# Patient Record
Sex: FEMALE | Race: WHITE | NOT HISPANIC OR LATINO | Employment: FULL TIME | RURAL
[De-identification: names, ages, dates, MRNs, and addresses within clinical notes are randomized per-mention and may not be internally consistent; named-entity substitution may affect disease eponyms.]

---

## 2020-01-02 ENCOUNTER — HISTORICAL (OUTPATIENT)
Dept: ADMINISTRATIVE | Facility: HOSPITAL | Age: 45
End: 2020-01-02

## 2020-01-07 LAB
LAB AP CAP CHECKLIST - HISTORICAL: NORMAL
LAB AP CLINICAL INFORMATION: NORMAL
LAB AP COMMENTS: NORMAL
LAB AP DIAGNOSIS - HISTORICAL: NORMAL
LAB AP GROSS PATHOLOGY - HISTORICAL: NORMAL
LAB AP SPECIMEN SUBMITTED - HISTORICAL: NORMAL

## 2022-07-08 ENCOUNTER — OFFICE VISIT (OUTPATIENT)
Dept: FAMILY MEDICINE | Facility: CLINIC | Age: 47
End: 2022-07-08
Payer: COMMERCIAL

## 2022-07-08 VITALS
SYSTOLIC BLOOD PRESSURE: 120 MMHG | HEIGHT: 63 IN | BODY MASS INDEX: 25.76 KG/M2 | OXYGEN SATURATION: 99 % | HEART RATE: 70 BPM | TEMPERATURE: 98 F | RESPIRATION RATE: 18 BRPM | DIASTOLIC BLOOD PRESSURE: 84 MMHG | WEIGHT: 145.38 LBS

## 2022-07-08 DIAGNOSIS — M32.9 LUPUS: ICD-10-CM

## 2022-07-08 DIAGNOSIS — R53.82 CHRONIC FATIGUE: ICD-10-CM

## 2022-07-08 DIAGNOSIS — E53.8 VITAMIN B 12 DEFICIENCY: ICD-10-CM

## 2022-07-08 DIAGNOSIS — E55.9 VITAMIN D DEFICIENCY: ICD-10-CM

## 2022-07-08 LAB
25(OH)D3 SERPL-MCNC: 21.2 NG/ML
ALBUMIN SERPL BCP-MCNC: 4 G/DL (ref 3.5–5)
ALBUMIN/GLOB SERPL: 1.1 {RATIO}
ALP SERPL-CCNC: 78 U/L (ref 39–100)
ALT SERPL W P-5'-P-CCNC: 36 U/L (ref 13–56)
ANION GAP SERPL CALCULATED.3IONS-SCNC: 14 MMOL/L (ref 7–16)
AST SERPL W P-5'-P-CCNC: 20 U/L (ref 15–37)
BASOPHILS # BLD AUTO: 0.04 K/UL (ref 0–0.2)
BASOPHILS NFR BLD AUTO: 0.7 % (ref 0–1)
BILIRUB SERPL-MCNC: 0.7 MG/DL (ref 0–1.2)
BUN SERPL-MCNC: 18 MG/DL (ref 7–18)
BUN/CREAT SERPL: 21 (ref 6–20)
CALCIUM SERPL-MCNC: 8.7 MG/DL (ref 8.5–10.1)
CHLORIDE SERPL-SCNC: 104 MMOL/L (ref 98–107)
CO2 SERPL-SCNC: 23 MMOL/L (ref 21–32)
CREAT SERPL-MCNC: 0.86 MG/DL (ref 0.55–1.02)
CRP SERPL-MCNC: 0.96 MG/DL (ref 0–0.8)
DIFFERENTIAL METHOD BLD: ABNORMAL
EOSINOPHIL # BLD AUTO: 0.14 K/UL (ref 0–0.5)
EOSINOPHIL NFR BLD AUTO: 2.5 % (ref 1–4)
ERYTHROCYTE [DISTWIDTH] IN BLOOD BY AUTOMATED COUNT: 14.2 % (ref 11.5–14.5)
ERYTHROCYTE [SEDIMENTATION RATE] IN BLOOD BY WESTERGREN METHOD: 33 MM/HR (ref 0–20)
FOLATE SERPL-MCNC: 13.5 NG/ML (ref 3.1–17.5)
GLOBULIN SER-MCNC: 3.7 G/DL (ref 2–4)
GLUCOSE SERPL-MCNC: 93 MG/DL (ref 74–106)
HCT VFR BLD AUTO: 35 % (ref 38–47)
HGB BLD-MCNC: 11.2 G/DL (ref 12–16)
IMM GRANULOCYTES # BLD AUTO: 0.01 K/UL (ref 0–0.04)
IMM GRANULOCYTES NFR BLD: 0.2 % (ref 0–0.4)
LYMPHOCYTES # BLD AUTO: 1.15 K/UL (ref 1–4.8)
LYMPHOCYTES NFR BLD AUTO: 20.7 % (ref 27–41)
MCH RBC QN AUTO: 26.7 PG (ref 27–31)
MCHC RBC AUTO-ENTMCNC: 32 G/DL (ref 32–36)
MCV RBC AUTO: 83.3 FL (ref 80–96)
MONOCYTES # BLD AUTO: 0.4 K/UL (ref 0–0.8)
MONOCYTES NFR BLD AUTO: 7.2 % (ref 2–6)
MPC BLD CALC-MCNC: 10.9 FL (ref 9.4–12.4)
NEUTROPHILS # BLD AUTO: 3.81 K/UL (ref 1.8–7.7)
NEUTROPHILS NFR BLD AUTO: 68.7 % (ref 53–65)
NRBC # BLD AUTO: 0 X10E3/UL
NRBC, AUTO (.00): 0 %
PLATELET # BLD AUTO: 278 K/UL (ref 150–400)
POTASSIUM SERPL-SCNC: 3.5 MMOL/L (ref 3.5–5.1)
PROT SERPL-MCNC: 7.7 G/DL (ref 6.4–8.2)
RBC # BLD AUTO: 4.2 M/UL (ref 4.2–5.4)
RHEUMATOID FACT SER NEPH-ACNC: NEGATIVE [IU]/ML
SODIUM SERPL-SCNC: 137 MMOL/L (ref 136–145)
T4 SERPL-MCNC: 10.7 ΜG/DL (ref 4.8–13.9)
TSH SERPL DL<=0.005 MIU/L-ACNC: 1.06 UIU/ML (ref 0.36–3.74)
VIT B12 SERPL-MCNC: 435 PG/ML (ref 193–986)
WBC # BLD AUTO: 5.55 K/UL (ref 4.5–11)

## 2022-07-08 PROCEDURE — 3074F PR MOST RECENT SYSTOLIC BLOOD PRESSURE < 130 MM HG: ICD-10-PCS | Mod: ,,, | Performed by: FAMILY MEDICINE

## 2022-07-08 PROCEDURE — 3079F DIAST BP 80-89 MM HG: CPT | Mod: ,,, | Performed by: FAMILY MEDICINE

## 2022-07-08 PROCEDURE — 80050 GENERAL HEALTH PANEL: CPT | Mod: ,,, | Performed by: CLINICAL MEDICAL LABORATORY

## 2022-07-08 PROCEDURE — 1159F MED LIST DOCD IN RCRD: CPT | Mod: ,,, | Performed by: FAMILY MEDICINE

## 2022-07-08 PROCEDURE — 99214 OFFICE O/P EST MOD 30 MIN: CPT | Mod: ,,, | Performed by: FAMILY MEDICINE

## 2022-07-08 PROCEDURE — 86038 ANTINUCLEAR ANTIBODIES: CPT | Mod: ,,, | Performed by: CLINICAL MEDICAL LABORATORY

## 2022-07-08 PROCEDURE — 80050 PR  GENERAL HEALTH PANEL: ICD-10-PCS | Mod: ,,, | Performed by: CLINICAL MEDICAL LABORATORY

## 2022-07-08 PROCEDURE — 86235 ANTIEXTRACTABLE NUCLEAR AG: ICD-10-PCS | Mod: ,,, | Performed by: CLINICAL MEDICAL LABORATORY

## 2022-07-08 PROCEDURE — 84436 T4: ICD-10-PCS | Mod: ,,, | Performed by: CLINICAL MEDICAL LABORATORY

## 2022-07-08 PROCEDURE — 3079F PR MOST RECENT DIASTOLIC BLOOD PRESSURE 80-89 MM HG: ICD-10-PCS | Mod: ,,, | Performed by: FAMILY MEDICINE

## 2022-07-08 PROCEDURE — 86225 DNA ANTIBODY NATIVE: CPT | Mod: ,,, | Performed by: CLINICAL MEDICAL LABORATORY

## 2022-07-08 PROCEDURE — 82306 VITAMIN D: ICD-10-PCS | Mod: ,,, | Performed by: CLINICAL MEDICAL LABORATORY

## 2022-07-08 PROCEDURE — 85651 RBC SED RATE NONAUTOMATED: CPT | Mod: ,,, | Performed by: CLINICAL MEDICAL LABORATORY

## 2022-07-08 PROCEDURE — 86430 RHEUMATOID FACTOR TEST QUAL: CPT | Mod: ,,, | Performed by: CLINICAL MEDICAL LABORATORY

## 2022-07-08 PROCEDURE — 86430 RHEUMATOID FACTOR SCREEN: ICD-10-PCS | Mod: ,,, | Performed by: CLINICAL MEDICAL LABORATORY

## 2022-07-08 PROCEDURE — 1159F PR MEDICATION LIST DOCUMENTED IN MEDICAL RECORD: ICD-10-PCS | Mod: ,,, | Performed by: FAMILY MEDICINE

## 2022-07-08 PROCEDURE — 82746 ASSAY OF FOLIC ACID SERUM: CPT | Mod: ,,, | Performed by: CLINICAL MEDICAL LABORATORY

## 2022-07-08 PROCEDURE — 3008F BODY MASS INDEX DOCD: CPT | Mod: ,,, | Performed by: FAMILY MEDICINE

## 2022-07-08 PROCEDURE — 82607 VITAMIN B12/FOLATE, SERUM PANEL: ICD-10-PCS | Mod: ,,, | Performed by: CLINICAL MEDICAL LABORATORY

## 2022-07-08 PROCEDURE — 86235 NUCLEAR ANTIGEN ANTIBODY: CPT | Mod: ,,, | Performed by: CLINICAL MEDICAL LABORATORY

## 2022-07-08 PROCEDURE — 82746 VITAMIN B12/FOLATE, SERUM PANEL: ICD-10-PCS | Mod: ,,, | Performed by: CLINICAL MEDICAL LABORATORY

## 2022-07-08 PROCEDURE — 86140 C-REACTIVE PROTEIN: CPT | Mod: ,,, | Performed by: CLINICAL MEDICAL LABORATORY

## 2022-07-08 PROCEDURE — 99214 PR OFFICE/OUTPT VISIT, EST, LEVL IV, 30-39 MIN: ICD-10-PCS | Mod: ,,, | Performed by: FAMILY MEDICINE

## 2022-07-08 PROCEDURE — 86038 ANA EIA W/REFLEX DSDNA/ENA: ICD-10-PCS | Mod: ,,, | Performed by: CLINICAL MEDICAL LABORATORY

## 2022-07-08 PROCEDURE — 82607 VITAMIN B-12: CPT | Mod: ,,, | Performed by: CLINICAL MEDICAL LABORATORY

## 2022-07-08 PROCEDURE — 84436 ASSAY OF TOTAL THYROXINE: CPT | Mod: ,,, | Performed by: CLINICAL MEDICAL LABORATORY

## 2022-07-08 PROCEDURE — 86140 C-REACTIVE PROTEIN: ICD-10-PCS | Mod: ,,, | Performed by: CLINICAL MEDICAL LABORATORY

## 2022-07-08 PROCEDURE — 3074F SYST BP LT 130 MM HG: CPT | Mod: ,,, | Performed by: FAMILY MEDICINE

## 2022-07-08 PROCEDURE — 82306 VITAMIN D 25 HYDROXY: CPT | Mod: ,,, | Performed by: CLINICAL MEDICAL LABORATORY

## 2022-07-08 PROCEDURE — 86225 ANTI-DNA ANTIBODY, DOUBLE-STRANDED: ICD-10-PCS | Mod: ,,, | Performed by: CLINICAL MEDICAL LABORATORY

## 2022-07-08 PROCEDURE — 3008F PR BODY MASS INDEX (BMI) DOCUMENTED: ICD-10-PCS | Mod: ,,, | Performed by: FAMILY MEDICINE

## 2022-07-08 PROCEDURE — 85651 SEDIMENTATION RATE, AUTOMATED: ICD-10-PCS | Mod: ,,, | Performed by: CLINICAL MEDICAL LABORATORY

## 2022-07-08 RX ORDER — PREDNISONE 10 MG/1
10 TABLET ORAL DAILY
Qty: 30 TABLET | Refills: 0 | Status: SHIPPED | OUTPATIENT
Start: 2022-07-08 | End: 2022-09-07 | Stop reason: ALTCHOICE

## 2022-07-08 RX ORDER — DICLOFENAC SODIUM 50 MG/1
50 TABLET, DELAYED RELEASE ORAL 2 TIMES DAILY
Qty: 60 TABLET | Refills: 1 | Status: SHIPPED | OUTPATIENT
Start: 2022-07-08 | End: 2022-09-28 | Stop reason: SDUPTHER

## 2022-07-08 RX ORDER — ERGOCALCIFEROL 1.25 MG/1
50000 CAPSULE ORAL
Qty: 12 CAPSULE | Refills: 1 | Status: SHIPPED | OUTPATIENT
Start: 2022-07-08 | End: 2023-03-01

## 2022-07-08 NOTE — ASSESSMENT & PLAN NOTE
Vitamin-D and B12 levels today.  CBC today.  Will hold off on B12 injections into we get her B12 level back.

## 2022-07-08 NOTE — ASSESSMENT & PLAN NOTE
Patient has chronic fatigue probably secondary to her lupus.  Will treat her with prednisone today and put her on diclofenac for pain.  She is also having some insomnia.  Will give her a trial of Ambien 5 mg at HS.  Follow-up in 1 month or after lab test her back.

## 2022-07-08 NOTE — ASSESSMENT & PLAN NOTE
History of vitamin-D deficiency.  Will start her on 83281 units weekly.  Check a vitamin-D level today.

## 2022-07-08 NOTE — ASSESSMENT & PLAN NOTE
I suspicion a flare of her lupus so will check an EDWINA rheumatoid factor sed rate in CRP.  Will start her on prednisone over a 3 week..  Will also give her Voltaren  50 mg b.i.d. for pain.  Will follow-up after her lab tests are complete in 2 weeks.

## 2022-07-08 NOTE — PROGRESS NOTES
"   Farhan Steve DO   [address] [place]  [address]      PATIENT NAME: Anamika Banda  : 1975  DATE: 22  MRN: [id number]      Billing Provider: Farhan Steve DO  Level of Service:   Patient PCP Information     Provider PCP Type    Farhan Steve DO General          Reason for Visit / Chief Complaint: Headache (Has been having a lot of headaches over the last couple of weeks.) and Fatigue (Feels very tired and like she is in a "fog".  Has been going on for about the last month or so but has been getting worse over the past couple of weeks.)       Update PCP  Update Chief Complaint         History of Present Illness / Problem Focused Workflow     Anamika Banda presents to the clinic with Headache (Has been having a lot of headaches over the last couple of weeks.) and Fatigue (Feels very tired and like she is in a "fog".  Has been going on for about the last month or so but has been getting worse over the past couple of weeks.)     Patient presents today with increasing fatigue and worsening aches and pains all over.  She is also having difficulty sleeping with her able to initiate sleep her about 2-3 hours but not able to maintain sleep.  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  She does have aches and pains all over has a history of lupus but has stopped her medication in the past.  She has been on Plaquenil in the past.  She denies any fever chills or night sweats.  She has had B12 and vitamin-D deficiencies in the past.  Patient does have a history of a melanoma on her upper back within the last 2 years.    Headache   Pertinent negatives include no abdominal pain, back pain, coughing, dizziness, ear pain, eye pain, eye redness, fever, nausea, neck pain, numbness, seizures, sinus pressure, sore throat, vomiting or weakness.   Fatigue  Associated symptoms include arthralgias, fatigue and headaches. Pertinent negatives " include no abdominal pain, change in bowel habit, chest pain, chills, congestion, coughing, fever, myalgias, nausea, neck pain, numbness, rash, sore throat, vertigo, vomiting or weakness.       Review of Systems     Review of Systems   Constitutional: Positive for fatigue. Negative for activity change, appetite change, chills and fever.   HENT: Negative for nasal congestion, ear discharge, ear pain, mouth dryness, mouth sores, postnasal drip, sinus pressure/congestion, sore throat and voice change.    Eyes: Negative for pain, discharge, redness, itching and visual disturbance.   Respiratory: Negative for apnea, cough, chest tightness, shortness of breath and wheezing.    Cardiovascular: Negative for chest pain, palpitations and leg swelling.   Gastrointestinal: Negative for abdominal distention, abdominal pain, anal bleeding, blood in stool, change in bowel habit, constipation, diarrhea, nausea, vomiting, reflux and change in bowel habit.   Endocrine: Negative for cold intolerance, heat intolerance, polydipsia, polyphagia and polyuria.   Genitourinary: Negative for difficulty urinating, enuresis, frequency, genital sores, hematuria, hot flashes, menstrual irregularity, urgency and vaginal dryness.   Musculoskeletal: Positive for arthralgias. Negative for back pain, gait problem, leg pain, myalgias and neck pain.   Integumentary:  Negative for rash, mole/lesion, breast mass, breast discharge and breast tenderness.   Allergic/Immunologic: Negative for environmental allergies and food allergies.   Neurological: Positive for headaches. Negative for dizziness, vertigo, tremors, seizures, syncope, facial asymmetry, speech difficulty, weakness, light-headedness, numbness, disturbances in coordination, memory loss and coordination difficulties.   Hematological: Negative for adenopathy. Does not bruise/bleed easily.   Psychiatric/Behavioral: Negative for agitation, behavioral problems, confusion, decreased concentration,  dysphoric mood, hallucinations, self-injury, sleep disturbance and suicidal ideas. The patient is not nervous/anxious and is not hyperactive.    Breast: Negative for mass and tenderness      Medical / Social / Family History     Past Medical History:   Diagnosis Date    Lupus        Past Surgical History:   Procedure Laterality Date    AUGMENTATION OF BREAST Bilateral      SECTION         Social History  Ms.  reports that she has never smoked. She has never used smokeless tobacco. She reports previous alcohol use. She reports previous drug use.    Family History  Ms.'s family history includes Cancer in her father; Hypertension in her mother.    Medications and Allergies     Medications  No outpatient medications have been marked as taking for the 22 encounter (Office Visit) with Farhan Steve DO.       Allergies  Review of patient's allergies indicates:  No Known Allergies    Physical Examination     Vitals:    22 1041   BP: 120/84   Pulse: 70   Resp: 18   Temp: 98.2 °F (36.8 °C)     Physical Exam  Vitals reviewed.   Constitutional:       General: She is not in acute distress.     Appearance: Normal appearance. She is normal weight.   HENT:      Head: Normocephalic and atraumatic.      Nose: Nose normal.      Mouth/Throat:      Mouth: Mucous membranes are moist.      Pharynx: Oropharynx is clear. No oropharyngeal exudate or posterior oropharyngeal erythema.   Eyes:      General: No scleral icterus.     Extraocular Movements: Extraocular movements intact.      Conjunctiva/sclera: Conjunctivae normal.      Pupils: Pupils are equal, round, and reactive to light.   Neck:      Vascular: No carotid bruit.   Cardiovascular:      Rate and Rhythm: Normal rate and regular rhythm.      Pulses: Normal pulses.      Heart sounds: Normal heart sounds. No murmur heard.    No friction rub. No gallop.   Pulmonary:      Effort: Pulmonary effort is normal.      Breath sounds: Normal breath sounds. No wheezing.    Abdominal:      General: Abdomen is flat. Bowel sounds are normal.      Palpations: Abdomen is soft.      Tenderness: There is no abdominal tenderness.   Musculoskeletal:         General: Normal range of motion.      Cervical back: Normal range of motion and neck supple. No tenderness.      Right lower leg: No edema.      Left lower leg: No edema.   Lymphadenopathy:      Cervical: No cervical adenopathy.   Skin:     General: Skin is warm and dry.      Capillary Refill: Capillary refill takes less than 2 seconds.      Coloration: Skin is not jaundiced.      Findings: No lesion or rash.   Neurological:      General: No focal deficit present.      Mental Status: She is alert and oriented to person, place, and time. Mental status is at baseline.      Cranial Nerves: No cranial nerve deficit.      Sensory: No sensory deficit.      Motor: No weakness.      Coordination: Coordination normal.      Gait: Gait normal.      Deep Tendon Reflexes: Reflexes normal.   Psychiatric:         Mood and Affect: Mood normal.         Behavior: Behavior normal.         Thought Content: Thought content normal.         Judgment: Judgment normal.               No results found for: WBC, HGB, HCT, MCV, PLT       No results found for: NA, K, CL, CO2, GLU, BUN, CREATININE, CALCIUM, PROT, ALBUMIN, BILITOT, ALKPHOS, AST, ALT, ANIONGAP, ESTGFRAFRICA, EGFRNONAA   No image results found.     Procedures   Assessment and Plan (including Health Maintenance)      Problem List  Smart Sets  Document Outside HM   :    Plan:         Health Maintenance Due   Topic Date Due    Cervical Cancer Screening  Never done    Lipid Panel  Never done    COVID-19 Vaccine (1) Never done    TETANUS VACCINE  Never done    Mammogram  Never done    Colorectal Cancer Screening  Never done       Problem List Items Addressed This Visit        Immunology/Multi System    Lupus    Current Assessment & Plan     I suspicion a flare of her lupus so will check an EDWINA rheumatoid  factor sed rate in CRP.  Will start her on prednisone over a 3 week..  Will also give her Voltaren  50 mg b.i.d. for pain.  Will follow-up after her lab tests are complete in 2 weeks.           Relevant Medications    predniSONE (DELTASONE) 10 MG tablet    diclofenac (VOLTAREN) 50 MG EC tablet    Other Relevant Orders    CBC Auto Differential    Comprehensive Metabolic Panel    T4    Sedimentation Rate    C-Reactive Protein    EDWINA EIA w/ Reflex to dsDNA/HARMEET    Rheumatoid Factor Screen    Ambulatory referral/consult to Rheumatology       Endocrine    Vitamin B 12 deficiency    Current Assessment & Plan     Vitamin-D and B12 levels today.  CBC today.  Will hold off on B12 injections into we get her B12 level back.           Relevant Orders    Vitamin B12 & Folate    Vitamin D deficiency    Current Assessment & Plan     History of vitamin-D deficiency.  Will start her on 30519 units weekly.  Check a vitamin-D level today.           Relevant Medications    ergocalciferol (ERGOCALCIFEROL) 50,000 unit Cap    Other Relevant Orders    Vitamin D    Vitamin D       Other    Chronic fatigue    Current Assessment & Plan     Patient has chronic fatigue probably secondary to her lupus.  Will treat her with prednisone today and put her on diclofenac for pain.  She is also having some insomnia.  Will give her a trial of Ambien 5 mg at HS.  Follow-up in 1 month or after lab test her back.           Relevant Orders    CBC Auto Differential    Comprehensive Metabolic Panel    T4    TSH          Health Maintenance Topics with due status: Not Due       Topic Last Completion Date    Influenza Vaccine Not Due       No future appointments.     No follow-ups on file.     Signature:  Farhan Steve DO  [facility]  [id number] [place] [address], [place]  [address]    Date of encounter: 7/8/22

## 2022-07-12 DIAGNOSIS — F51.01 PRIMARY INSOMNIA: ICD-10-CM

## 2022-07-12 LAB
ANA SER QL: POSITIVE
DSDNA IGG SERPL IA-ACNC: 9 IU (ref 0–24)
DSDNA IGG SERPL IA-ACNC: NEGATIVE [IU]/ML
ENA AB SER QL IA: 2.6 EUS (ref 0–19.9)
ENA AB SER QL IA: NEGATIVE

## 2022-07-12 RX ORDER — ZOLPIDEM TARTRATE 5 MG/1
5 TABLET ORAL NIGHTLY PRN
Qty: 30 TABLET | Refills: 2 | Status: SHIPPED | OUTPATIENT
Start: 2022-07-12 | End: 2023-03-24 | Stop reason: SDUPTHER

## 2022-07-14 ENCOUNTER — TELEPHONE (OUTPATIENT)
Dept: FAMILY MEDICINE | Facility: CLINIC | Age: 47
End: 2022-07-14
Payer: COMMERCIAL

## 2022-07-14 DIAGNOSIS — M32.9 LUPUS: Primary | ICD-10-CM

## 2022-09-07 ENCOUNTER — OFFICE VISIT (OUTPATIENT)
Dept: FAMILY MEDICINE | Facility: CLINIC | Age: 47
End: 2022-09-07
Payer: COMMERCIAL

## 2022-09-07 VITALS
TEMPERATURE: 99 F | OXYGEN SATURATION: 99 % | WEIGHT: 147 LBS | HEART RATE: 64 BPM | RESPIRATION RATE: 20 BRPM | DIASTOLIC BLOOD PRESSURE: 78 MMHG | HEIGHT: 63 IN | SYSTOLIC BLOOD PRESSURE: 120 MMHG | BODY MASS INDEX: 26.05 KG/M2

## 2022-09-07 DIAGNOSIS — M32.9 LUPUS: Primary | ICD-10-CM

## 2022-09-07 DIAGNOSIS — R53.82 CHRONIC FATIGUE: ICD-10-CM

## 2022-09-07 PROCEDURE — 1159F PR MEDICATION LIST DOCUMENTED IN MEDICAL RECORD: ICD-10-PCS | Mod: ,,, | Performed by: FAMILY MEDICINE

## 2022-09-07 PROCEDURE — 99214 PR OFFICE/OUTPT VISIT, EST, LEVL IV, 30-39 MIN: ICD-10-PCS | Mod: ,,, | Performed by: FAMILY MEDICINE

## 2022-09-07 PROCEDURE — 1159F MED LIST DOCD IN RCRD: CPT | Mod: ,,, | Performed by: FAMILY MEDICINE

## 2022-09-07 PROCEDURE — 99214 OFFICE O/P EST MOD 30 MIN: CPT | Mod: ,,, | Performed by: FAMILY MEDICINE

## 2022-09-07 PROCEDURE — 3074F PR MOST RECENT SYSTOLIC BLOOD PRESSURE < 130 MM HG: ICD-10-PCS | Mod: ,,, | Performed by: FAMILY MEDICINE

## 2022-09-07 PROCEDURE — 3008F PR BODY MASS INDEX (BMI) DOCUMENTED: ICD-10-PCS | Mod: ,,, | Performed by: FAMILY MEDICINE

## 2022-09-07 PROCEDURE — 3074F SYST BP LT 130 MM HG: CPT | Mod: ,,, | Performed by: FAMILY MEDICINE

## 2022-09-07 PROCEDURE — 3078F DIAST BP <80 MM HG: CPT | Mod: ,,, | Performed by: FAMILY MEDICINE

## 2022-09-07 PROCEDURE — 3078F PR MOST RECENT DIASTOLIC BLOOD PRESSURE < 80 MM HG: ICD-10-PCS | Mod: ,,, | Performed by: FAMILY MEDICINE

## 2022-09-07 PROCEDURE — 3008F BODY MASS INDEX DOCD: CPT | Mod: ,,, | Performed by: FAMILY MEDICINE

## 2022-09-07 RX ORDER — PREDNISONE 10 MG/1
10 TABLET ORAL DAILY
Qty: 30 TABLET | Refills: 0 | Status: SHIPPED | OUTPATIENT
Start: 2022-09-07 | End: 2022-09-28 | Stop reason: ALTCHOICE

## 2022-09-07 RX ORDER — HYDROXYCHLOROQUINE SULFATE 200 MG/1
200 TABLET, FILM COATED ORAL DAILY
Qty: 30 TABLET | Refills: 5 | Status: SHIPPED | OUTPATIENT
Start: 2022-09-07 | End: 2023-03-24

## 2022-09-07 RX ORDER — HYDROCODONE BITARTRATE AND ACETAMINOPHEN 7.5; 325 MG/1; MG/1
1 TABLET ORAL EVERY 6 HOURS PRN
Qty: 20 TABLET | Refills: 0 | Status: SHIPPED | OUTPATIENT
Start: 2022-09-07 | End: 2023-07-20

## 2022-09-07 NOTE — ASSESSMENT & PLAN NOTE
Lupus with worsening hip pain primarily on the left side.  Will start her back on a prednisone taper over a period of 10 days.  Will also put her back on Plaquenil 200 mg daily.  Reviewed her  and did give her Norco 7.5 mg 20. Total for pain.  Follow-up on a p.r.n. basis or 2 weeks

## 2022-09-07 NOTE — ASSESSMENT & PLAN NOTE
History chronic fatigue related to her lupus.  Will start her back on Plaquenil 200 mg once daily.  Recheck her liver function tests in 2 weeks.  Will also recheck a sed rate and CRP at that time.   present x 4 quadrants

## 2022-09-07 NOTE — PROGRESS NOTES
Farhan Steve DO   [address] [place]  [address]      PATIENT NAME: Anamika Banda  : 1975  DATE: 22  MRN: [id number]      Billing Provider: Farhan Steve DO  Level of Service:   Patient PCP Information       Provider PCP Type    Farhan Steve DO General            Reason for Visit / Chief Complaint: Arthritis (Patient is having a lot of hip pain. She sees the rheumatologist in November for consultation())       Update PCP  Update Chief Complaint         History of Present Illness / Problem Focused Workflow     Anamika Banda presents to the clinic with Arthritis (Patient is having a lot of hip pain. She sees the rheumatologist in November for consultation())     Patient is having a lot of hip pain. she also is having a lot of fatigue.  She has previously been on Plaquenil but has not been restarted on it by Rheumatology.  She states that she is having trouble walking towards the end of the day.  Most her pain is in her left hip but she does have some her right hip.  She denies any fevers chills sweats.  She denies any radiation of pain from her back.    Arthritis  She complains of joint swelling. Pertinent negatives include no diarrhea, fatigue, fever or rash.     Review of Systems     Review of Systems   Constitutional:  Positive for activity change. Negative for appetite change, chills, fatigue and fever.   HENT:  Negative for nasal congestion, ear discharge, ear pain, mouth dryness, mouth sores, postnasal drip, sinus pressure/congestion, sore throat and voice change.    Eyes:  Negative for pain, discharge, redness, itching and visual disturbance.   Respiratory:  Negative for apnea, cough, chest tightness, shortness of breath and wheezing.    Cardiovascular:  Negative for chest pain, palpitations and leg swelling.   Gastrointestinal:  Negative for abdominal distention, abdominal pain, anal bleeding, blood in stool, change in bowel  habit, constipation, diarrhea, nausea, vomiting, reflux and change in bowel habit.   Endocrine: Negative for cold intolerance, heat intolerance, polydipsia, polyphagia and polyuria.   Genitourinary:  Negative for difficulty urinating, enuresis, frequency, genital sores, hematuria, hot flashes, menstrual irregularity, urgency and vaginal dryness.   Musculoskeletal:  Positive for arthritis, joint swelling, leg pain and joint deformity. Negative for arthralgias, back pain, gait problem, myalgias and neck pain.   Integumentary:  Negative for rash, mole/lesion, breast mass, breast discharge and breast tenderness.   Allergic/Immunologic: Negative for environmental allergies and food allergies.   Neurological:  Negative for dizziness, vertigo, tremors, seizures, syncope, facial asymmetry, speech difficulty, weakness, light-headedness, numbness, headaches, coordination difficulties, memory loss and coordination difficulties.   Hematological:  Negative for adenopathy. Does not bruise/bleed easily.   Psychiatric/Behavioral:  Negative for agitation, behavioral problems, confusion, decreased concentration, dysphoric mood, hallucinations, self-injury, sleep disturbance and suicidal ideas. The patient is not nervous/anxious and is not hyperactive.    Breast: Negative for mass and tenderness    Medical / Social / Family History     Past Medical History:   Diagnosis Date    Arthritis     Insomnia     Lupus        Past Surgical History:   Procedure Laterality Date    AUGMENTATION OF BREAST Bilateral      SECTION         Social History  Ms.  reports that she has never smoked. She has never used smokeless tobacco. She reports current alcohol use of about 1.0 standard drink per week. She reports that she does not use drugs.    Family History  Ms.'s family history includes Cancer in her father; Hypertension in her mother.    Medications and Allergies     Medications  Outpatient Medications Marked as Taking for the 22  encounter (Office Visit) with Farhan Steve, DO   Medication Sig Dispense Refill    diclofenac (VOLTAREN) 50 MG EC tablet Take 1 tablet (50 mg total) by mouth 2 (two) times daily. 60 tablet 1    ergocalciferol (ERGOCALCIFEROL) 50,000 unit Cap Take 1 capsule (50,000 Units total) by mouth every 7 days. 12 capsule 1    zolpidem (AMBIEN) 5 MG Tab Take 1 tablet (5 mg total) by mouth nightly as needed (sleep). 30 tablet 2       Allergies  Review of patient's allergies indicates:  No Known Allergies    Physical Examination     Vitals:    09/07/22 1609   BP: 120/78   Pulse: 64   Resp: 20   Temp: 98.7 °F (37.1 °C)     Physical Exam  Vitals reviewed.   Constitutional:       Appearance: Normal appearance. She is normal weight.   HENT:      Head: Normocephalic and atraumatic.      Nose: Nose normal.      Mouth/Throat:      Mouth: Mucous membranes are moist.      Pharynx: Oropharynx is clear. No oropharyngeal exudate or posterior oropharyngeal erythema.   Eyes:      General: No scleral icterus.     Extraocular Movements: Extraocular movements intact.      Conjunctiva/sclera: Conjunctivae normal.      Pupils: Pupils are equal, round, and reactive to light.   Neck:      Vascular: No carotid bruit.   Cardiovascular:      Rate and Rhythm: Normal rate and regular rhythm.      Pulses: Normal pulses.      Heart sounds: Normal heart sounds. No murmur heard.    No gallop.   Pulmonary:      Effort: Pulmonary effort is normal.      Breath sounds: Normal breath sounds. No wheezing.   Abdominal:      General: Abdomen is flat. Bowel sounds are normal.      Palpations: Abdomen is soft.   Musculoskeletal:         General: Tenderness present. Normal range of motion.      Cervical back: Normal range of motion and neck supple.      Right lower leg: No edema.      Left lower leg: No edema.      Comments: Decreased range of motion in the left hip.  There is no did tenderness in the trochanteric bursa area.  She does have some pain on flexion of  the hip.  Abduction is decreased.  Neurovascular is intact.   Lymphadenopathy:      Cervical: No cervical adenopathy.   Skin:     General: Skin is warm and dry.      Capillary Refill: Capillary refill takes less than 2 seconds.      Coloration: Skin is not jaundiced.      Findings: No lesion.   Neurological:      General: No focal deficit present.      Mental Status: She is alert and oriented to person, place, and time. Mental status is at baseline.      Cranial Nerves: No cranial nerve deficit.      Sensory: No sensory deficit.      Motor: No weakness.      Coordination: Coordination normal.      Gait: Gait normal.      Deep Tendon Reflexes: Reflexes normal.   Psychiatric:         Mood and Affect: Mood normal.         Behavior: Behavior normal.         Thought Content: Thought content normal.         Judgment: Judgment normal.             Lab Results   Component Value Date    WBC 5.55 07/08/2022    HGB 11.2 (L) 07/08/2022    HCT 35.0 (L) 07/08/2022    MCV 83.3 07/08/2022     07/08/2022          Sodium   Date Value Ref Range Status   07/08/2022 137 136 - 145 mmol/L Final     Potassium   Date Value Ref Range Status   07/08/2022 3.5 3.5 - 5.1 mmol/L Final     Chloride   Date Value Ref Range Status   07/08/2022 104 98 - 107 mmol/L Final     CO2   Date Value Ref Range Status   07/08/2022 23 21 - 32 mmol/L Final     Glucose   Date Value Ref Range Status   07/08/2022 93 74 - 106 mg/dL Final     BUN   Date Value Ref Range Status   07/08/2022 18 7 - 18 mg/dL Final     Creatinine   Date Value Ref Range Status   07/08/2022 0.86 0.55 - 1.02 mg/dL Final     Calcium   Date Value Ref Range Status   07/08/2022 8.7 8.5 - 10.1 mg/dL Final     Total Protein   Date Value Ref Range Status   07/08/2022 7.7 6.4 - 8.2 g/dL Final     Albumin   Date Value Ref Range Status   07/08/2022 4.0 3.5 - 5.0 g/dL Final     Bilirubin, Total   Date Value Ref Range Status   07/08/2022 0.7 0.0 - 1.2 mg/dL Final     Alk Phos   Date Value Ref Range  Status   07/08/2022 78 39 - 100 U/L Final     AST   Date Value Ref Range Status   07/08/2022 20 15 - 37 U/L Final     ALT   Date Value Ref Range Status   07/08/2022 36 13 - 56 U/L Final     Anion Gap   Date Value Ref Range Status   07/08/2022 14 7 - 16 mmol/L Final     eGFR   Date Value Ref Range Status   07/08/2022 76 >=60 mL/min/1.73m² Final      No image results found.     Procedures   Assessment and Plan (including Health Maintenance)      Problem List  Smart Sets  Document Outside HM   :    Plan:         Health Maintenance Due   Topic Date Due    Cervical Cancer Screening  Never done    Lipid Panel  Never done    COVID-19 Vaccine (1) Never done    TETANUS VACCINE  Never done    Mammogram  Never done    Colorectal Cancer Screening  Never done    Influenza Vaccine (1) Never done       Problem List Items Addressed This Visit          Immunology/Multi System    Lupus - Primary    Current Assessment & Plan     Lupus with worsening hip pain primarily on the left side.  Will start her back on a prednisone taper over a period of 10 days.  Will also put her back on Plaquenil 200 mg daily.  Reviewed her  and did give her Norco 7.5 mg 20. Total for pain.  Follow-up on a p.r.n. basis or 2 weeks         Relevant Medications    hydrOXYchloroQUINE (PLAQUENIL) 200 mg tablet    predniSONE (DELTASONE) 10 MG tablet    HYDROcodone-acetaminophen (NORCO) 7.5-325 mg per tablet    Other Relevant Orders    Hepatic Function Panel       Other    Chronic fatigue    Current Assessment & Plan     History chronic fatigue related to her lupus.  Will start her back on Plaquenil 200 mg once daily.  Recheck her liver function tests in 2 weeks.  Will also recheck a sed rate and CRP at that time.            The patient has no Health Maintenance topics of status Not Due    Future Appointments   Date Time Provider Department Center   9/28/2022  8:00 AM Farhan Steve DO [facility]   11/3/2022  1:40 PM Alexis Gray MD [facility]  [facility]        Follow up in about 2 weeks (around 9/21/2022).     Signature:  Farhan Steve, [place] [id number] [place]  [address]    Date of encounter: 9/7/22

## 2022-09-28 ENCOUNTER — OFFICE VISIT (OUTPATIENT)
Dept: FAMILY MEDICINE | Facility: CLINIC | Age: 47
End: 2022-09-28
Payer: COMMERCIAL

## 2022-09-28 VITALS
OXYGEN SATURATION: 99 % | DIASTOLIC BLOOD PRESSURE: 76 MMHG | HEART RATE: 84 BPM | RESPIRATION RATE: 18 BRPM | WEIGHT: 147 LBS | BODY MASS INDEX: 26.05 KG/M2 | SYSTOLIC BLOOD PRESSURE: 116 MMHG | HEIGHT: 63 IN | TEMPERATURE: 99 F

## 2022-09-28 DIAGNOSIS — E53.8 VITAMIN B 12 DEFICIENCY: ICD-10-CM

## 2022-09-28 DIAGNOSIS — Z12.11 ENCOUNTER FOR SCREENING FOR MALIGNANT NEOPLASM OF COLON: ICD-10-CM

## 2022-09-28 DIAGNOSIS — Z13.220 SCREENING FOR LIPOID DISORDERS: ICD-10-CM

## 2022-09-28 DIAGNOSIS — Z00.00 ROUTINE GENERAL MEDICAL EXAMINATION AT A HEALTH CARE FACILITY: Primary | ICD-10-CM

## 2022-09-28 DIAGNOSIS — Z13.1 SCREENING FOR DIABETES MELLITUS: ICD-10-CM

## 2022-09-28 DIAGNOSIS — C43.59 MALIGNANT MELANOMA OF TORSO EXCLUDING BREAST: ICD-10-CM

## 2022-09-28 DIAGNOSIS — M32.9 LUPUS: ICD-10-CM

## 2022-09-28 DIAGNOSIS — Z12.31 ENCOUNTER FOR SCREENING MAMMOGRAM FOR MALIGNANT NEOPLASM OF BREAST: ICD-10-CM

## 2022-09-28 DIAGNOSIS — E55.9 VITAMIN D DEFICIENCY: ICD-10-CM

## 2022-09-28 PROCEDURE — 3008F PR BODY MASS INDEX (BMI) DOCUMENTED: ICD-10-PCS | Mod: ,,, | Performed by: FAMILY MEDICINE

## 2022-09-28 PROCEDURE — 99396 PR PREVENTIVE VISIT,EST,40-64: ICD-10-PCS | Mod: ,,, | Performed by: FAMILY MEDICINE

## 2022-09-28 PROCEDURE — 1159F MED LIST DOCD IN RCRD: CPT | Mod: ,,, | Performed by: FAMILY MEDICINE

## 2022-09-28 PROCEDURE — 3078F DIAST BP <80 MM HG: CPT | Mod: ,,, | Performed by: FAMILY MEDICINE

## 2022-09-28 PROCEDURE — 3074F SYST BP LT 130 MM HG: CPT | Mod: ,,, | Performed by: FAMILY MEDICINE

## 2022-09-28 PROCEDURE — 3078F PR MOST RECENT DIASTOLIC BLOOD PRESSURE < 80 MM HG: ICD-10-PCS | Mod: ,,, | Performed by: FAMILY MEDICINE

## 2022-09-28 PROCEDURE — 3008F BODY MASS INDEX DOCD: CPT | Mod: ,,, | Performed by: FAMILY MEDICINE

## 2022-09-28 PROCEDURE — 99396 PREV VISIT EST AGE 40-64: CPT | Mod: ,,, | Performed by: FAMILY MEDICINE

## 2022-09-28 PROCEDURE — 1159F PR MEDICATION LIST DOCUMENTED IN MEDICAL RECORD: ICD-10-PCS | Mod: ,,, | Performed by: FAMILY MEDICINE

## 2022-09-28 PROCEDURE — 3074F PR MOST RECENT SYSTOLIC BLOOD PRESSURE < 130 MM HG: ICD-10-PCS | Mod: ,,, | Performed by: FAMILY MEDICINE

## 2022-09-28 RX ORDER — DICLOFENAC POTASSIUM 50 MG/1
1 TABLET, FILM COATED ORAL 2 TIMES DAILY
COMMUNITY
Start: 2022-08-17 | End: 2023-07-20

## 2022-09-29 DIAGNOSIS — Z12.11 SCREENING FOR MALIGNANT NEOPLASM OF COLON: Primary | ICD-10-CM

## 2022-09-29 LAB
ALBUMIN SERPL BCP-MCNC: 4 G/DL (ref 3.5–5)
ALP SERPL-CCNC: 55 U/L (ref 39–100)
ALT SERPL W P-5'-P-CCNC: 33 U/L (ref 13–56)
AST SERPL W P-5'-P-CCNC: 21 U/L (ref 15–37)
BILIRUB DIRECT SERPL-MCNC: <0.1 MG/DL (ref 0–0.2)
BILIRUB SERPL-MCNC: 0.5 MG/DL (ref ?–1.2)
CHOLEST SERPL-MCNC: 260 MG/DL (ref 0–200)
CHOLEST/HDLC SERPL: 5.8 {RATIO}
GLUCOSE SERPL-MCNC: 101 MG/DL (ref 74–106)
HDLC SERPL-MCNC: 45 MG/DL (ref 40–60)
LDLC SERPL CALC-MCNC: 179 MG/DL
LDLC/HDLC SERPL: 4 {RATIO}
NONHDLC SERPL-MCNC: 215 MG/DL
PROT SERPL-MCNC: 6.9 G/DL (ref 6.4–8.2)
TRIGL SERPL-MCNC: 181 MG/DL (ref 35–150)
VLDLC SERPL-MCNC: 36 MG/DL

## 2022-09-29 PROCEDURE — 82947 GLUCOSE, FASTING: ICD-10-PCS | Mod: ,,, | Performed by: CLINICAL MEDICAL LABORATORY

## 2022-09-29 PROCEDURE — 80076 HEPATIC FUNCTION PANEL: CPT | Mod: ,,, | Performed by: CLINICAL MEDICAL LABORATORY

## 2022-09-29 PROCEDURE — 80061 LIPID PANEL: ICD-10-PCS | Mod: ,,, | Performed by: CLINICAL MEDICAL LABORATORY

## 2022-09-29 PROCEDURE — 80061 LIPID PANEL: CPT | Mod: ,,, | Performed by: CLINICAL MEDICAL LABORATORY

## 2022-09-29 PROCEDURE — 80076 HEPATIC FUNCTION PANEL: ICD-10-PCS | Mod: ,,, | Performed by: CLINICAL MEDICAL LABORATORY

## 2022-09-29 PROCEDURE — 82947 ASSAY GLUCOSE BLOOD QUANT: CPT | Mod: ,,, | Performed by: CLINICAL MEDICAL LABORATORY

## 2022-09-29 RX ORDER — SODIUM CHLORIDE, SODIUM LACTATE, POTASSIUM CHLORIDE, CALCIUM CHLORIDE 600; 310; 30; 20 MG/100ML; MG/100ML; MG/100ML; MG/100ML
INJECTION, SOLUTION INTRAVENOUS CONTINUOUS
Status: CANCELLED | OUTPATIENT
Start: 2022-09-29

## 2022-09-29 NOTE — PROGRESS NOTES
Farhan Steve DO   [address] [place]  [address]      PATIENT NAME: Anamika Banda  : 1975  DATE: 22  MRN: [id number]      Billing Provider: Farhan Steve DO  Level of Service:   Patient PCP Information       Provider PCP Type    Farhan Steve DO General            Reason for Visit / Chief Complaint: Healthy You (Pt is not fasting this morning. )       Update PCP  Update Chief Complaint         History of Present Illness / Problem Focused Workflow     Anamika Banda presents to the clinic with Healthy You (Pt is not fasting this morning. )     Patient is in for healthy you today.  She has had history of a melanoma and she does have a lot of family history of cancers.  She also has history of systemic lupus and is on Plaquenil and sees Rheumatology in for her problem.  She does take diclofenac for her arthritis symptoms.  She denies any chest pain shortness of breath palpitations.  She does take Ambien for insomnia.  Patient has not had a colonoscopy and requested a mammogram.      Review of Systems     Review of Systems   Constitutional:  Negative for activity change, appetite change, chills, fatigue and fever.   HENT:  Negative for nasal congestion, ear discharge, ear pain, mouth dryness, mouth sores, postnasal drip, sinus pressure/congestion, sore throat and voice change.    Eyes:  Negative for pain, discharge, redness, itching and visual disturbance.   Respiratory:  Negative for apnea, cough, chest tightness, shortness of breath and wheezing.    Cardiovascular:  Negative for chest pain, palpitations and leg swelling.   Gastrointestinal:  Negative for abdominal distention, abdominal pain, anal bleeding, blood in stool, change in bowel habit, constipation, diarrhea, nausea, vomiting, reflux and change in bowel habit.   Endocrine: Negative for cold intolerance, heat intolerance, polydipsia, polyphagia and polyuria.   Genitourinary:  Negative for difficulty  urinating, enuresis, frequency, genital sores, hematuria, hot flashes, menstrual irregularity, urgency and vaginal dryness.   Musculoskeletal:  Negative for arthralgias, back pain, gait problem, leg pain, myalgias and neck pain.   Integumentary:  Negative for rash, mole/lesion, breast mass, breast discharge and breast tenderness.   Allergic/Immunologic: Negative for environmental allergies and food allergies.   Neurological:  Negative for dizziness, vertigo, tremors, seizures, syncope, facial asymmetry, speech difficulty, weakness, light-headedness, numbness, headaches, coordination difficulties, memory loss and coordination difficulties.   Hematological:  Negative for adenopathy. Does not bruise/bleed easily.   Psychiatric/Behavioral:  Negative for agitation, behavioral problems, confusion, decreased concentration, dysphoric mood, hallucinations, self-injury, sleep disturbance and suicidal ideas. The patient is not nervous/anxious and is not hyperactive.    Breast: Negative for mass and tenderness    Medical / Social / Family History     Past Medical History:   Diagnosis Date    Arthritis     Insomnia     Lupus        Past Surgical History:   Procedure Laterality Date    AUGMENTATION OF BREAST Bilateral      SECTION         Social History  Ms.  reports that she has never smoked. She has never used smokeless tobacco. She reports current alcohol use of about 1.0 standard drink per week. She reports that she does not use drugs.    Family History  Ms.'s family history includes Cancer in her father; Hypertension in her mother.    Medications and Allergies     Medications  Outpatient Medications Marked as Taking for the 22 encounter (Office Visit) with Farhan Steve, DO   Medication Sig Dispense Refill    diclofenac (CATAFLAM) 50 MG tablet Take 1 tablet by mouth 2 (two) times a day.      ergocalciferol (ERGOCALCIFEROL) 50,000 unit Cap Take 1 capsule (50,000 Units total) by mouth every 7 days. 12 capsule 1     HYDROcodone-acetaminophen (NORCO) 7.5-325 mg per tablet Take 1 tablet by mouth every 6 (six) hours as needed for Pain. 20 tablet 0    hydrOXYchloroQUINE (PLAQUENIL) 200 mg tablet Take 1 tablet (200 mg total) by mouth once daily. 30 tablet 5       Allergies  Review of patient's allergies indicates:  No Known Allergies    Physical Examination     Vitals:    09/28/22 0847   BP: 116/76   Pulse: 84   Resp: 18   Temp: 98.5 °F (36.9 °C)     Physical Exam  Vitals reviewed.   Constitutional:       General: She is not in acute distress.     Appearance: Normal appearance. She is normal weight.   HENT:      Head: Normocephalic and atraumatic.      Nose: Nose normal.      Mouth/Throat:      Mouth: Mucous membranes are moist.      Pharynx: Oropharynx is clear. No oropharyngeal exudate or posterior oropharyngeal erythema.   Eyes:      General: No scleral icterus.     Extraocular Movements: Extraocular movements intact.      Conjunctiva/sclera: Conjunctivae normal.      Pupils: Pupils are equal, round, and reactive to light.   Neck:      Vascular: No carotid bruit.   Cardiovascular:      Rate and Rhythm: Normal rate and regular rhythm.      Pulses: Normal pulses.      Heart sounds: Normal heart sounds. No murmur heard.    No gallop.   Pulmonary:      Effort: Pulmonary effort is normal.      Breath sounds: Normal breath sounds. No wheezing.   Abdominal:      General: Abdomen is flat. Bowel sounds are normal.      Palpations: Abdomen is soft.      Tenderness: There is no abdominal tenderness.   Musculoskeletal:         General: Normal range of motion.      Cervical back: Normal range of motion and neck supple.      Right lower leg: No edema.      Left lower leg: No edema.   Lymphadenopathy:      Cervical: No cervical adenopathy.   Skin:     General: Skin is warm and dry.      Capillary Refill: Capillary refill takes less than 2 seconds.      Coloration: Skin is not jaundiced.      Findings: No lesion or rash.   Neurological:       General: No focal deficit present.      Mental Status: She is alert and oriented to person, place, and time. Mental status is at baseline.      Cranial Nerves: No cranial nerve deficit.      Sensory: No sensory deficit.      Motor: No weakness.      Coordination: Coordination normal.      Gait: Gait normal.      Deep Tendon Reflexes: Reflexes normal.   Psychiatric:         Mood and Affect: Mood normal.         Behavior: Behavior normal.         Thought Content: Thought content normal.         Judgment: Judgment normal.             Lab Results   Component Value Date    WBC 5.55 07/08/2022    HGB 11.2 (L) 07/08/2022    HCT 35.0 (L) 07/08/2022    MCV 83.3 07/08/2022     07/08/2022          Sodium   Date Value Ref Range Status   07/08/2022 137 136 - 145 mmol/L Final     Potassium   Date Value Ref Range Status   07/08/2022 3.5 3.5 - 5.1 mmol/L Final     Chloride   Date Value Ref Range Status   07/08/2022 104 98 - 107 mmol/L Final     CO2   Date Value Ref Range Status   07/08/2022 23 21 - 32 mmol/L Final     Glucose   Date Value Ref Range Status   07/08/2022 93 74 - 106 mg/dL Final     BUN   Date Value Ref Range Status   07/08/2022 18 7 - 18 mg/dL Final     Creatinine   Date Value Ref Range Status   07/08/2022 0.86 0.55 - 1.02 mg/dL Final     Calcium   Date Value Ref Range Status   07/08/2022 8.7 8.5 - 10.1 mg/dL Final     Total Protein   Date Value Ref Range Status   07/08/2022 7.7 6.4 - 8.2 g/dL Final     Albumin   Date Value Ref Range Status   07/08/2022 4.0 3.5 - 5.0 g/dL Final     Bilirubin, Total   Date Value Ref Range Status   07/08/2022 0.7 0.0 - 1.2 mg/dL Final     Alk Phos   Date Value Ref Range Status   07/08/2022 78 39 - 100 U/L Final     AST   Date Value Ref Range Status   07/08/2022 20 15 - 37 U/L Final     ALT   Date Value Ref Range Status   07/08/2022 36 13 - 56 U/L Final     Anion Gap   Date Value Ref Range Status   07/08/2022 14 7 - 16 mmol/L Final     eGFR   Date Value Ref Range Status    07/08/2022 76 >=60 mL/min/1.73m² Final      No image results found.     Procedures   Assessment and Plan (including Health Maintenance)      Problem List  Smart Sets  Document Outside HM   :    Plan:         Health Maintenance Due   Topic Date Due    Lipid Panel  Never done    TETANUS VACCINE  Never done    Mammogram  Never done    Colorectal Cancer Screening  Never done       Problem List Items Addressed This Visit          Immunology/Multi System    Lupus    Current Assessment & Plan     History of lupus is currently stable.  Maintain her on her Plaquenil.  She is to follow-up with Rheumatology.            Oncology    Malignant melanoma of torso excluding breast    Current Assessment & Plan     Mammogram right breast that was totally excised is.  Follows up with Dermatology.  No recurrence.            Endocrine    Vitamin B 12 deficiency    Current Assessment & Plan     History of B12 deficiency and will replace that with oral medication follow-up p.r.n. or 6 months.         Vitamin D deficiency    Current Assessment & Plan     And history of vitamin-D deficiency and we will refill her weekly medication follow-up on a p.r.n. basis.  Labs in 6 months            Other    Routine general medical examination at a health care facility - Primary    Current Assessment & Plan     History of cancers in her family but she also has had a melanoma in the past.  Will obtain mammogram as well as a colonoscopy i.e. referral to GI.  Will check a lipid and glucose on her today.            The patient has no Health Maintenance topics of status Not Due    Future Appointments   Date Time Provider Department Center   11/3/2022  1:40 PM [facility] MD [facility]   1/25/2023  9:30 AM [facility] MAMMO1 [facility]   9/28/2023  8:00 AM Farhan Steve DO [facility] A[facility] Tran        Follow up in about 3 months (around 12/28/2022).     Signature:  DO [facility]  [id number]  [facility] [address]  [place]  [address]    Date of encounter: 9/28/22

## 2022-09-29 NOTE — ASSESSMENT & PLAN NOTE
History of cancers in her family but she also has had a melanoma in the past.  Will obtain mammogram as well as a colonoscopy i.e. referral to GI.  Will check a lipid and glucose on her today.

## 2022-09-29 NOTE — ASSESSMENT & PLAN NOTE
History of B12 deficiency and will replace that with oral medication follow-up p.r.n. or 6 months.

## 2022-09-29 NOTE — ASSESSMENT & PLAN NOTE
History of lupus is currently stable.  Maintain her on her Plaquenil.  She is to follow-up with Rheumatology.

## 2022-09-29 NOTE — ASSESSMENT & PLAN NOTE
And history of vitamin-D deficiency and we will refill her weekly medication follow-up on a p.r.n. basis.  Labs in 6 months

## 2022-10-05 ENCOUNTER — ANESTHESIA EVENT (OUTPATIENT)
Dept: GASTROENTEROLOGY | Facility: HOSPITAL | Age: 47
End: 2022-10-05
Payer: COMMERCIAL

## 2022-10-05 ENCOUNTER — ANESTHESIA (OUTPATIENT)
Dept: GASTROENTEROLOGY | Facility: HOSPITAL | Age: 47
End: 2022-10-05
Payer: COMMERCIAL

## 2022-10-05 ENCOUNTER — HOSPITAL ENCOUNTER (OUTPATIENT)
Dept: GASTROENTEROLOGY | Facility: HOSPITAL | Age: 47
Discharge: HOME | End: 2022-10-05
Attending: SURGERY
Payer: COMMERCIAL

## 2022-10-05 VITALS
BODY MASS INDEX: 24.8 KG/M2 | DIASTOLIC BLOOD PRESSURE: 70 MMHG | TEMPERATURE: 98 F | WEIGHT: 140 LBS | HEIGHT: 63 IN | SYSTOLIC BLOOD PRESSURE: 108 MMHG | HEART RATE: 69 BPM | RESPIRATION RATE: 20 BRPM | OXYGEN SATURATION: 98 %

## 2022-10-05 DIAGNOSIS — Z12.11 SCREENING FOR MALIGNANT NEOPLASM OF COLON: ICD-10-CM

## 2022-10-05 DIAGNOSIS — Z12.11 ENCOUNTER FOR SCREENING FOR MALIGNANT NEOPLASM OF COLON: ICD-10-CM

## 2022-10-05 LAB — HCG UR QL IA.RAPID: NEGATIVE

## 2022-10-05 PROCEDURE — D9220A PRA ANESTHESIA: Mod: ,,, | Performed by: NURSE ANESTHETIST, CERTIFIED REGISTERED

## 2022-10-05 PROCEDURE — G0105 COLORECTAL SCRN; HI RISK IND: ICD-10-PCS | Mod: ,,, | Performed by: SURGERY

## 2022-10-05 PROCEDURE — 63600175 PHARM REV CODE 636 W HCPCS: Performed by: SURGERY

## 2022-10-05 PROCEDURE — D9220A PRA ANESTHESIA: ICD-10-PCS | Mod: ,,, | Performed by: NURSE ANESTHETIST, CERTIFIED REGISTERED

## 2022-10-05 PROCEDURE — 37000009 HC ANESTHESIA EA ADD 15 MINS

## 2022-10-05 PROCEDURE — 37000008 HC ANESTHESIA 1ST 15 MINUTES

## 2022-10-05 PROCEDURE — 25000003 PHARM REV CODE 250: Performed by: NURSE ANESTHETIST, CERTIFIED REGISTERED

## 2022-10-05 PROCEDURE — 27000284 HC CANNULA NASAL: Performed by: NURSE ANESTHETIST, CERTIFIED REGISTERED

## 2022-10-05 PROCEDURE — G0105 COLORECTAL SCRN; HI RISK IND: HCPCS | Mod: ,,, | Performed by: SURGERY

## 2022-10-05 PROCEDURE — G0105 COLORECTAL SCRN; HI RISK IND: HCPCS

## 2022-10-05 PROCEDURE — 81025 URINE PREGNANCY TEST: CPT | Performed by: SURGERY

## 2022-10-05 PROCEDURE — G0121 COLON CA SCRN NOT HI RSK IND: HCPCS

## 2022-10-05 PROCEDURE — 63600175 PHARM REV CODE 636 W HCPCS: Performed by: NURSE ANESTHETIST, CERTIFIED REGISTERED

## 2022-10-05 RX ORDER — SODIUM CHLORIDE, SODIUM LACTATE, POTASSIUM CHLORIDE, CALCIUM CHLORIDE 600; 310; 30; 20 MG/100ML; MG/100ML; MG/100ML; MG/100ML
INJECTION, SOLUTION INTRAVENOUS CONTINUOUS
Status: DISCONTINUED | OUTPATIENT
Start: 2022-10-05 | End: 2022-10-06 | Stop reason: HOSPADM

## 2022-10-05 RX ORDER — LIDOCAINE HYDROCHLORIDE 20 MG/ML
INJECTION INTRAVENOUS
Status: DISCONTINUED | OUTPATIENT
Start: 2022-10-05 | End: 2022-10-05

## 2022-10-05 RX ORDER — PROPOFOL 10 MG/ML
VIAL (ML) INTRAVENOUS
Status: DISCONTINUED | OUTPATIENT
Start: 2022-10-05 | End: 2022-10-05

## 2022-10-05 RX ADMIN — PROPOFOL 50 MG: 10 INJECTION, EMULSION INTRAVENOUS at 12:10

## 2022-10-05 RX ADMIN — LIDOCAINE HYDROCHLORIDE 50 MG: 20 INJECTION INTRAVENOUS at 12:10

## 2022-10-05 RX ADMIN — PROPOFOL 100 MG: 10 INJECTION, EMULSION INTRAVENOUS at 12:10

## 2022-10-05 RX ADMIN — SODIUM CHLORIDE, POTASSIUM CHLORIDE, SODIUM LACTATE AND CALCIUM CHLORIDE: 600; 310; 30; 20 INJECTION, SOLUTION INTRAVENOUS at 10:10

## 2022-10-05 NOTE — H&P
[facility] - Endoscopy  History & Physical    Subjective:      Chief Complaint/Reason for Admission: Patient is a 48 yo female who is here for evaluation for a colonoscopy.  She never had a colonoscopy.  They deny any abdominal pain, nausea, weight loss, constipation, nor any blood in stool.  Pt's father and paternal GM both with colon cancer in their 60s and 70s respectively      Anamika Banda is a 47 y.o. female.    Past Medical History:   Diagnosis Date    Arthritis     Insomnia     Lupus      Past Surgical History:   Procedure Laterality Date    AUGMENTATION OF BREAST Bilateral      SECTION       Family History   Problem Relation Age of Onset    Hypertension Mother     Cancer Father      Social History     Tobacco Use    Smoking status: Never    Smokeless tobacco: Never   Substance Use Topics    Alcohol use: Yes     Alcohol/week: 1.0 standard drink     Types: 1 Glasses of wine per week     Comment: occassionally    Drug use: Never       (Not in a hospital admission)    Review of patient's allergies indicates:  No Known Allergies     Review of Systems   Constitutional:  Negative for fever and weight loss.   HENT:  Negative for hearing loss.    Eyes:  Negative for blurred vision.   Respiratory:  Negative for cough.    Cardiovascular:  Negative for chest pain.   Gastrointestinal:  Negative for abdominal pain, blood in stool, constipation, diarrhea, nausea and vomiting.   Genitourinary:  Negative for dysuria.   Skin:  Negative for rash.   Neurological:  Negative for dizziness and tremors.   Psychiatric/Behavioral:  Negative for hallucinations and suicidal ideas.      Objective:      Vital Signs (Most Recent)  Temp: 98.1 °F (36.7 °C) (10/05/22 1004)  Pulse: 86 (10/05/22 1004)  Resp: 16 (10/05/22 1004)  BP: 139/81 (10/05/22 1004)  SpO2: 99 % (10/05/22 1004)    Vital Signs Range (Last 24H):  Temp:  [98.1 °F (36.7 °C)]   Pulse:  [86]   Resp:  [16]   BP: (139)/(81)   SpO2:  [99 %]     Physical  Exam  Constitutional:       General: She is not in acute distress.  HENT:      Head: Normocephalic.   Cardiovascular:      Rate and Rhythm: Normal rate and regular rhythm.      Pulses: Normal pulses.   Pulmonary:      Effort: Pulmonary effort is normal. No respiratory distress.      Breath sounds: Normal breath sounds.   Abdominal:      General: Abdomen is flat. There is no distension.      Palpations: Abdomen is soft.      Tenderness: There is no abdominal tenderness.   Musculoskeletal:         General: Normal range of motion.   Skin:     General: Skin is warm.   Neurological:      General: No focal deficit present.      Mental Status: She is oriented to person, place, and time.       Data Review:  CBC:   Lab Results   Component Value Date    WBC 5.55 07/08/2022    RBC 4.20 07/08/2022    HGB 11.2 (L) 07/08/2022    HCT 35.0 (L) 07/08/2022     07/08/2022     BMP:   Lab Results   Component Value Date    GLU 93 07/08/2022     07/08/2022    K 3.5 07/08/2022     07/08/2022    CO2 23 07/08/2022    BUN 18 07/08/2022    CREATININE 0.86 07/08/2022    CALCIUM 8.7 07/08/2022      ECG: no prior ECG.     Assessment:      There are no hospital problems to display for this patient.  OR for screening colonoscopy    Plan:    Patient to go to the operating room for a colonoscopy. Risks and benefits explained to the patient including risk of bleeding, infection, missed lesion, perforation, and possible need for additional operation. All questions were answered.

## 2022-10-05 NOTE — ANESTHESIA PREPROCEDURE EVALUATION
10/05/2022  Anamika Banda is a 47 y.o., female.      Pre-op Assessment    I have reviewed the Patient Summary Reports.    I have reviewed the NPO Status.   I have reviewed the Medications.     Review of Systems  Anesthesia Hx:  No problems with previous Anesthesia      Past Medical History:   Diagnosis Date    Arthritis     Insomnia     Lupus        Past Surgical History:   Procedure Laterality Date    AUGMENTATION OF BREAST Bilateral      SECTION             Social History     Socioeconomic History    Marital status:     Number of children: 5   Tobacco Use    Smoking status: Never    Smokeless tobacco: Never   Substance and Sexual Activity    Alcohol use: Yes     Alcohol/week: 1.0 standard drink     Types: 1 Glasses of wine per week     Comment: occassionally    Drug use: Never    Sexual activity: Yes     Partners: Male       Current Outpatient Medications   Medication Sig Dispense Refill    HYDROcodone-acetaminophen (NORCO) 7.5-325 mg per tablet Take 1 tablet by mouth every 6 (six) hours as needed for Pain. 20 tablet 0    zolpidem (AMBIEN) 5 MG Tab Take 1 tablet (5 mg total) by mouth nightly as needed (sleep). 30 tablet 2    diclofenac (CATAFLAM) 50 MG tablet Take 1 tablet by mouth 2 (two) times a day.      ergocalciferol (ERGOCALCIFEROL) 50,000 unit Cap Take 1 capsule (50,000 Units total) by mouth every 7 days. 12 capsule 1    hydrOXYchloroQUINE (PLAQUENIL) 200 mg tablet Take 1 tablet (200 mg total) by mouth once daily. 30 tablet 5     Current Facility-Administered Medications   Medication Dose Route Frequency Provider Last Rate Last Admin    lactated ringers infusion   Intravenous Continuous Efren Rasmussen MD 75 mL/hr at 10/05/22 1007 New Bag at 10/05/22 1007       Review of patient's allergies indicates:  No Known Allergies    Physical Exam  General: Well  nourished    Airway:  Mallampati: II   Mouth Opening: Normal  TM Distance: Normal  Tongue: Normal  Neck ROM: Normal ROM    Dental:  Intact        Anesthesia Plan  Type of Anesthesia, risks & benefits discussed:    Anesthesia Type: Gen Natural Airway  Intra-op Monitoring Plan: Standard ASA Monitors  Post Op Pain Control Plan: multimodal analgesia  Induction:  IV  Informed Consent: Informed consent signed with the Patient and all parties understand the risks and agree with anesthesia plan.  All questions answered. Patient consented to blood products? Yes  ASA Score: 2  Day of Surgery Review of History & Physical: H&P Update referred to the surgeon/provider.    Ready For Surgery From Anesthesia Perspective.     .

## 2022-10-05 NOTE — TRANSFER OF CARE
"Anesthesia Transfer of Care Note    Patient: Anamika Banda    Procedure(s) Performed: * No procedures listed *    Patient location: GI    Anesthesia Type: MAC and general    Transport from OR: Transported from OR on room air with adequate spontaneous ventilation    Post pain: adequate analgesia    Post assessment: no apparent anesthetic complications and tolerated procedure well    Post vital signs: stable    Level of consciousness: responds to stimulation and awake    Nausea/Vomiting: no nausea/vomiting    Complications: none    Transfer of care protocol was followed      Last vitals:   Visit Vitals  /74 (BP Location: Right arm, Patient Position: Lying)   Pulse 101   Temp 36.7 °C (98 °F) (Skin)   Resp (!) 24   Ht 5' 3" (1.6 m)   Wt 63.5 kg (140 lb)   LMP 09/10/2022 (Exact Date)   SpO2 99%   Breastfeeding No   BMI 24.80 kg/m²     "

## 2022-10-05 NOTE — ANESTHESIA POSTPROCEDURE EVALUATION
Anesthesia Post Evaluation    Patient: Anamika Banda    Procedure(s) Performed: * colonoscopy    Final Anesthesia Type: general      Patient location during evaluation: GI PACU  Patient participation: Yes- Able to Participate  Level of consciousness: awake and alert  Post-procedure vital signs: reviewed and stable  Pain management: adequate  Airway patency: patent    PONV status at discharge: No PONV  Anesthetic complications: no      Cardiovascular status: stable  Respiratory status: unassisted, spontaneous ventilation and room air  Hydration status: euvolemic  Follow-up not needed.          Vitals Value Taken Time   /70 10/05/22 1250   Temp 36.7 °C (98 °F) 10/05/22 1239   Pulse 69 10/05/22 1250   Resp 20 10/05/22 1250   SpO2 98 % 10/05/22 1250         Event Time   Out of Recovery 13:10:00         Pain/Destin Score: Destin Score: 10 (10/5/2022 12:55 PM)

## 2022-10-05 NOTE — PLAN OF CARE
1240  pt to pacu pt awake pt voices no complaints pt sao2 96% on ra will monitor     1310 pt vs stable pt up to side of bed to get dressed pt to exit via wheelchair and released to family

## 2022-10-12 DIAGNOSIS — E78.5 HYPERLIPIDEMIA, UNSPECIFIED HYPERLIPIDEMIA TYPE: Primary | ICD-10-CM

## 2022-10-13 RX ORDER — ATORVASTATIN CALCIUM 20 MG/1
20 TABLET, FILM COATED ORAL DAILY
Qty: 90 TABLET | Refills: 3 | Status: SHIPPED | OUTPATIENT
Start: 2022-10-13 | End: 2023-12-22 | Stop reason: SDUPTHER

## 2022-11-03 ENCOUNTER — OFFICE VISIT (OUTPATIENT)
Dept: RHEUMATOLOGY | Facility: CLINIC | Age: 47
End: 2022-11-03
Payer: COMMERCIAL

## 2022-11-03 VITALS
RESPIRATION RATE: 16 BRPM | OXYGEN SATURATION: 99 % | WEIGHT: 143 LBS | DIASTOLIC BLOOD PRESSURE: 76 MMHG | HEART RATE: 88 BPM | SYSTOLIC BLOOD PRESSURE: 124 MMHG | BODY MASS INDEX: 25.33 KG/M2

## 2022-11-03 DIAGNOSIS — T85.9XXS DISORDER OF BREAST IMPLANT, SEQUELA: Primary | ICD-10-CM

## 2022-11-03 DIAGNOSIS — M32.9 LUPUS: ICD-10-CM

## 2022-11-03 PROCEDURE — 99205 OFFICE O/P NEW HI 60 MIN: CPT | Mod: S$PBB,,, | Performed by: INTERNAL MEDICINE

## 2022-11-03 PROCEDURE — 3078F DIAST BP <80 MM HG: CPT | Mod: ,,, | Performed by: INTERNAL MEDICINE

## 2022-11-03 PROCEDURE — 3008F BODY MASS INDEX DOCD: CPT | Mod: ,,, | Performed by: INTERNAL MEDICINE

## 2022-11-03 PROCEDURE — 99205 PR OFFICE/OUTPT VISIT, NEW, LEVL V, 60-74 MIN: ICD-10-PCS | Mod: S$PBB,,, | Performed by: INTERNAL MEDICINE

## 2022-11-03 PROCEDURE — 3074F PR MOST RECENT SYSTOLIC BLOOD PRESSURE < 130 MM HG: ICD-10-PCS | Mod: ,,, | Performed by: INTERNAL MEDICINE

## 2022-11-03 PROCEDURE — 99214 OFFICE O/P EST MOD 30 MIN: CPT | Mod: PBBFAC | Performed by: INTERNAL MEDICINE

## 2022-11-03 PROCEDURE — 3074F SYST BP LT 130 MM HG: CPT | Mod: ,,, | Performed by: INTERNAL MEDICINE

## 2022-11-03 PROCEDURE — 1159F PR MEDICATION LIST DOCUMENTED IN MEDICAL RECORD: ICD-10-PCS | Mod: ,,, | Performed by: INTERNAL MEDICINE

## 2022-11-03 PROCEDURE — 3078F PR MOST RECENT DIASTOLIC BLOOD PRESSURE < 80 MM HG: ICD-10-PCS | Mod: ,,, | Performed by: INTERNAL MEDICINE

## 2022-11-03 PROCEDURE — 3008F PR BODY MASS INDEX (BMI) DOCUMENTED: ICD-10-PCS | Mod: ,,, | Performed by: INTERNAL MEDICINE

## 2022-11-03 PROCEDURE — 1159F MED LIST DOCD IN RCRD: CPT | Mod: ,,, | Performed by: INTERNAL MEDICINE

## 2022-11-03 RX ORDER — METHOTREXATE 2.5 MG/1
15 TABLET ORAL WEEKLY
Qty: 72 TABLET | Refills: 0 | Status: SHIPPED | OUTPATIENT
Start: 2022-11-03 | End: 2023-02-12 | Stop reason: SDUPTHER

## 2022-11-03 RX ORDER — FOLIC ACID 1 MG/1
1 TABLET ORAL DAILY
Qty: 100 TABLET | Refills: 1 | Status: SHIPPED | OUTPATIENT
Start: 2022-11-03 | End: 2023-12-22 | Stop reason: SDUPTHER
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
## 2022-11-07 ENCOUNTER — PATIENT MESSAGE (OUTPATIENT)
Dept: FAMILY MEDICINE | Facility: CLINIC | Age: 47
End: 2022-11-07
Payer: COMMERCIAL

## 2023-01-02 PROBLEM — Z00.00 ROUTINE GENERAL MEDICAL EXAMINATION AT A HEALTH CARE FACILITY: Status: RESOLVED | Noted: 2022-09-28 | Resolved: 2023-01-02

## 2023-01-25 ENCOUNTER — HOSPITAL ENCOUNTER (OUTPATIENT)
Dept: RADIOLOGY | Facility: HOSPITAL | Age: 48
Discharge: HOME | End: 2023-01-25
Attending: FAMILY MEDICINE
Payer: COMMERCIAL

## 2023-01-25 DIAGNOSIS — Z12.31 ENCOUNTER FOR SCREENING MAMMOGRAM FOR MALIGNANT NEOPLASM OF BREAST: ICD-10-CM

## 2023-01-25 PROCEDURE — 77067 SCR MAMMO BI INCL CAD: CPT | Mod: TC

## 2023-03-20 ENCOUNTER — PATIENT OUTREACH (OUTPATIENT)
Dept: ADMINISTRATIVE | Facility: HOSPITAL | Age: 48
End: 2023-03-20

## 2023-03-20 NOTE — PROGRESS NOTES
..Population Health Review...  ADDED NOTE FOR TETANUS SHOT, LETTER SENT VIA PP FOR CERVICAL SCREENING. NO TETANUS RECORD IN MIIX

## 2023-03-20 NOTE — LETTER
March 20, 2023       Anamika Banda  [address]  [place] [address]         Dear Anamika:    Your [facility] is dedicated to helping you stay healthy with regularly scheduled recommended screenings.  Scheduling routine screenings is important to maintaining good health. Our records indicate that you may be overdue for your screening pap smear. A pap smear screening can help identify patients at risk for developing cervical cancer at an early stage, when it is most likely to be successfully treated.    We encourage you to schedule your appointment with your [facility] provider or some primary care providers also perform this screening.    If you have completed or scheduled your pap smear screening outside of [facility], please notify your primary care team so we can update your health record.      If you have questions or would like to schedule your screening, please contact your primary care clinic.    Sincerely,    Farhan Steve DO and your [facility]

## 2023-03-20 NOTE — LETTER
March 20, 2023       Anamika Banda  [address]  [place] [address]         Dear Anamika:    Your [facility] is dedicated to helping you stay healthy with regularly scheduled recommended screenings.  Scheduling routine screenings is important to maintaining good health. Our records indicate that you may be overdue for your screening pap smear. A pap smear screening can help identify patients at risk for developing cervical cancer at an early stage, when it is most likely to be successfully treated.    We encourage you to schedule your appointment with your [facility] provider or some primary care providers also perform this screening.    If you have completed or scheduled your pap smear screening outside of [facility], please notify your primary care team so we can update your health record.      If you have questions or would like to schedule your screening, please contact your primary care clinic.    Sincerely,    Farhan Steve DO and your [facility]

## 2023-03-24 ENCOUNTER — OFFICE VISIT (OUTPATIENT)
Dept: FAMILY MEDICINE | Facility: CLINIC | Age: 48
End: 2023-03-24
Payer: COMMERCIAL

## 2023-03-24 VITALS
OXYGEN SATURATION: 96 % | SYSTOLIC BLOOD PRESSURE: 118 MMHG | HEIGHT: 63 IN | WEIGHT: 143 LBS | DIASTOLIC BLOOD PRESSURE: 86 MMHG | TEMPERATURE: 98 F | HEART RATE: 75 BPM | BODY MASS INDEX: 25.34 KG/M2 | RESPIRATION RATE: 17 BRPM

## 2023-03-24 DIAGNOSIS — Z13.1 SCREENING FOR DIABETES MELLITUS: ICD-10-CM

## 2023-03-24 DIAGNOSIS — E55.9 VITAMIN D DEFICIENCY: ICD-10-CM

## 2023-03-24 DIAGNOSIS — E53.8 VITAMIN B 12 DEFICIENCY: ICD-10-CM

## 2023-03-24 DIAGNOSIS — Z13.220 SCREENING FOR LIPOID DISORDERS: ICD-10-CM

## 2023-03-24 DIAGNOSIS — F51.01 PRIMARY INSOMNIA: ICD-10-CM

## 2023-03-24 DIAGNOSIS — E78.2 MIXED HYPERLIPIDEMIA: Primary | ICD-10-CM

## 2023-03-24 DIAGNOSIS — D89.9 DISEASE OF IMMUNE SYSTEM: ICD-10-CM

## 2023-03-24 DIAGNOSIS — C43.59 MALIGNANT MELANOMA OF TORSO EXCLUDING BREAST: ICD-10-CM

## 2023-03-24 DIAGNOSIS — Z23 ENCOUNTER FOR IMMUNIZATION: ICD-10-CM

## 2023-03-24 LAB
ALBUMIN SERPL BCP-MCNC: 4 G/DL (ref 3.5–5)
ALBUMIN/GLOB SERPL: 1.4 {RATIO}
ALP SERPL-CCNC: 79 U/L (ref 39–100)
ALT SERPL W P-5'-P-CCNC: 27 U/L (ref 13–56)
ANION GAP SERPL CALCULATED.3IONS-SCNC: 11 MMOL/L (ref 7–16)
AST SERPL W P-5'-P-CCNC: 17 U/L (ref 15–37)
BASOPHILS # BLD AUTO: 0.02 K/UL (ref 0–0.2)
BASOPHILS NFR BLD AUTO: 0.3 % (ref 0–1)
BILIRUB SERPL-MCNC: 0.5 MG/DL (ref ?–1.2)
BUN SERPL-MCNC: 14 MG/DL (ref 7–18)
BUN/CREAT SERPL: 21 (ref 6–20)
CALCIUM SERPL-MCNC: 9 MG/DL (ref 8.5–10.1)
CHLORIDE SERPL-SCNC: 106 MMOL/L (ref 98–107)
CHOLEST SERPL-MCNC: 134 MG/DL (ref 0–200)
CHOLEST/HDLC SERPL: 3 {RATIO}
CO2 SERPL-SCNC: 27 MMOL/L (ref 21–32)
CREAT SERPL-MCNC: 0.67 MG/DL (ref 0.55–1.02)
CREAT UR-MCNC: 291 MG/DL (ref 28–219)
DIFFERENTIAL METHOD BLD: ABNORMAL
EGFR (NO RACE VARIABLE) (RUSH/TITUS): 109 ML/MIN/1.73M²
EOSINOPHIL # BLD AUTO: 0.12 K/UL (ref 0–0.5)
EOSINOPHIL NFR BLD AUTO: 2.1 % (ref 1–4)
ERYTHROCYTE [DISTWIDTH] IN BLOOD BY AUTOMATED COUNT: 16.9 % (ref 11.5–14.5)
EST. AVERAGE GLUCOSE BLD GHB EST-MCNC: 94 MG/DL
FOLATE SERPL-MCNC: 15.3 NG/ML (ref 3.1–17.5)
GLOBULIN SER-MCNC: 2.9 G/DL (ref 2–4)
GLUCOSE SERPL-MCNC: 85 MG/DL (ref 74–106)
HBA1C MFR BLD HPLC: 5.4 % (ref 4.5–6.6)
HCT VFR BLD AUTO: 35.8 % (ref 38–47)
HDLC SERPL-MCNC: 45 MG/DL (ref 40–60)
HGB BLD-MCNC: 11.1 G/DL (ref 12–16)
IMM GRANULOCYTES # BLD AUTO: 0.01 K/UL (ref 0–0.04)
IMM GRANULOCYTES NFR BLD: 0.2 % (ref 0–0.4)
LDLC SERPL CALC-MCNC: 63 MG/DL
LDLC/HDLC SERPL: 1.4 {RATIO}
LYMPHOCYTES # BLD AUTO: 1.07 K/UL (ref 1–4.8)
LYMPHOCYTES NFR BLD AUTO: 18.5 % (ref 27–41)
MCH RBC QN AUTO: 26.4 PG (ref 27–31)
MCHC RBC AUTO-ENTMCNC: 31 G/DL (ref 32–36)
MCV RBC AUTO: 85.2 FL (ref 80–96)
MICROALBUMIN UR-MCNC: 3.6 MG/DL (ref 0–2.8)
MICROALBUMIN/CREAT RATIO PNL UR: 12.4 MG/G (ref 0–30)
MONOCYTES # BLD AUTO: 0.24 K/UL (ref 0–0.8)
MONOCYTES NFR BLD AUTO: 4.2 % (ref 2–6)
MPC BLD CALC-MCNC: 10.3 FL (ref 9.4–12.4)
NEUTROPHILS # BLD AUTO: 4.32 K/UL (ref 1.8–7.7)
NEUTROPHILS NFR BLD AUTO: 74.7 % (ref 53–65)
NONHDLC SERPL-MCNC: 89 MG/DL
NRBC # BLD AUTO: 0 X10E3/UL
NRBC, AUTO (.00): 0 %
PLATELET # BLD AUTO: 257 K/UL (ref 150–400)
POTASSIUM SERPL-SCNC: 4.2 MMOL/L (ref 3.5–5.1)
PROT SERPL-MCNC: 6.9 G/DL (ref 6.4–8.2)
RBC # BLD AUTO: 4.2 M/UL (ref 4.2–5.4)
SODIUM SERPL-SCNC: 140 MMOL/L (ref 136–145)
TRIGL SERPL-MCNC: 129 MG/DL (ref 35–150)
VIT B12 SERPL-MCNC: 470 PG/ML (ref 193–986)
VLDLC SERPL-MCNC: 26 MG/DL
WBC # BLD AUTO: 5.78 K/UL (ref 4.5–11)

## 2023-03-24 PROCEDURE — 3074F SYST BP LT 130 MM HG: CPT | Mod: ICN,,, | Performed by: FAMILY MEDICINE

## 2023-03-24 PROCEDURE — 3008F PR BODY MASS INDEX (BMI) DOCUMENTED: ICD-10-PCS | Mod: ICN,,, | Performed by: FAMILY MEDICINE

## 2023-03-24 PROCEDURE — 1159F PR MEDICATION LIST DOCUMENTED IN MEDICAL RECORD: ICD-10-PCS | Mod: ICN,,, | Performed by: FAMILY MEDICINE

## 2023-03-24 PROCEDURE — 80053 COMPREHENSIVE METABOLIC PANEL: ICD-10-PCS | Mod: ,,, | Performed by: CLINICAL MEDICAL LABORATORY

## 2023-03-24 PROCEDURE — 83036 HEMOGLOBIN GLYCOSYLATED A1C: CPT | Mod: ,,, | Performed by: CLINICAL MEDICAL LABORATORY

## 2023-03-24 PROCEDURE — 82746 VITAMIN B12/FOLATE, SERUM PANEL: ICD-10-PCS | Mod: ,,, | Performed by: CLINICAL MEDICAL LABORATORY

## 2023-03-24 PROCEDURE — 80053 COMPREHEN METABOLIC PANEL: CPT | Mod: ,,, | Performed by: CLINICAL MEDICAL LABORATORY

## 2023-03-24 PROCEDURE — 3079F PR MOST RECENT DIASTOLIC BLOOD PRESSURE 80-89 MM HG: ICD-10-PCS | Mod: ICN,,, | Performed by: FAMILY MEDICINE

## 2023-03-24 PROCEDURE — 90677 PCV20 VACCINE IM: CPT | Mod: ICN,,, | Performed by: FAMILY MEDICINE

## 2023-03-24 PROCEDURE — 90471 PNEUMOCOCCAL CONJUGATE VACCINE 20-VALENT: ICD-10-PCS | Mod: ICN,,, | Performed by: FAMILY MEDICINE

## 2023-03-24 PROCEDURE — 3079F DIAST BP 80-89 MM HG: CPT | Mod: ICN,,, | Performed by: FAMILY MEDICINE

## 2023-03-24 PROCEDURE — 85025 COMPLETE CBC W/AUTO DIFF WBC: CPT | Mod: ,,, | Performed by: CLINICAL MEDICAL LABORATORY

## 2023-03-24 PROCEDURE — 82043 UR ALBUMIN QUANTITATIVE: CPT | Mod: ,,, | Performed by: CLINICAL MEDICAL LABORATORY

## 2023-03-24 PROCEDURE — 99214 OFFICE O/P EST MOD 30 MIN: CPT | Mod: 25,ICN,, | Performed by: FAMILY MEDICINE

## 2023-03-24 PROCEDURE — 83036 HEMOGLOBIN A1C: ICD-10-PCS | Mod: ,,, | Performed by: CLINICAL MEDICAL LABORATORY

## 2023-03-24 PROCEDURE — 90471 IMMUNIZATION ADMIN: CPT | Mod: ICN,,, | Performed by: FAMILY MEDICINE

## 2023-03-24 PROCEDURE — 3008F BODY MASS INDEX DOCD: CPT | Mod: ICN,,, | Performed by: FAMILY MEDICINE

## 2023-03-24 PROCEDURE — 82607 VITAMIN B12/FOLATE, SERUM PANEL: ICD-10-PCS | Mod: ,,, | Performed by: CLINICAL MEDICAL LABORATORY

## 2023-03-24 PROCEDURE — 80061 LIPID PANEL: ICD-10-PCS | Mod: ,,, | Performed by: CLINICAL MEDICAL LABORATORY

## 2023-03-24 PROCEDURE — 3074F PR MOST RECENT SYSTOLIC BLOOD PRESSURE < 130 MM HG: ICD-10-PCS | Mod: ICN,,, | Performed by: FAMILY MEDICINE

## 2023-03-24 PROCEDURE — 82570 ASSAY OF URINE CREATININE: CPT | Mod: ,,, | Performed by: CLINICAL MEDICAL LABORATORY

## 2023-03-24 PROCEDURE — 82746 ASSAY OF FOLIC ACID SERUM: CPT | Mod: ,,, | Performed by: CLINICAL MEDICAL LABORATORY

## 2023-03-24 PROCEDURE — 85025 CBC WITH DIFFERENTIAL: ICD-10-PCS | Mod: ,,, | Performed by: CLINICAL MEDICAL LABORATORY

## 2023-03-24 PROCEDURE — 99214 PR OFFICE/OUTPT VISIT, EST, LEVL IV, 30-39 MIN: ICD-10-PCS | Mod: 25,ICN,, | Performed by: FAMILY MEDICINE

## 2023-03-24 PROCEDURE — 82607 VITAMIN B-12: CPT | Mod: ,,, | Performed by: CLINICAL MEDICAL LABORATORY

## 2023-03-24 PROCEDURE — 1159F MED LIST DOCD IN RCRD: CPT | Mod: ICN,,, | Performed by: FAMILY MEDICINE

## 2023-03-24 PROCEDURE — 90677 PNEUMOCOCCAL CONJUGATE VACCINE 20-VALENT: ICD-10-PCS | Mod: ICN,,, | Performed by: FAMILY MEDICINE

## 2023-03-24 PROCEDURE — 82043 MICROALBUMIN / CREATININE RATIO URINE: ICD-10-PCS | Mod: ,,, | Performed by: CLINICAL MEDICAL LABORATORY

## 2023-03-24 PROCEDURE — 82570 MICROALBUMIN / CREATININE RATIO URINE: ICD-10-PCS | Mod: ,,, | Performed by: CLINICAL MEDICAL LABORATORY

## 2023-03-24 PROCEDURE — 80061 LIPID PANEL: CPT | Mod: ,,, | Performed by: CLINICAL MEDICAL LABORATORY

## 2023-03-24 RX ORDER — ERGOCALCIFEROL 1.25 MG/1
CAPSULE ORAL
Qty: 12 CAPSULE | Refills: 1 | Status: SHIPPED | OUTPATIENT
Start: 2023-03-24 | End: 2023-12-22 | Stop reason: SDUPTHER

## 2023-03-24 RX ORDER — ZOLPIDEM TARTRATE 5 MG/1
5 TABLET ORAL NIGHTLY PRN
Qty: 30 TABLET | Refills: 2 | Status: SHIPPED | OUTPATIENT
Start: 2023-03-24 | End: 2023-12-22 | Stop reason: SDUPTHER

## 2023-03-24 NOTE — ASSESSMENT & PLAN NOTE
History of vitamin-D deficiency.  Will refill her medication.  Recheck her levels on a yearly basis.  Follow-up in 1 year.

## 2023-03-24 NOTE — ASSESSMENT & PLAN NOTE
History of B12 deficiency so will give her a 1000 mcg of B12 today.  Will check her B12 and folate levels and give her B12 monthly basis.  Follow-up every 6 months.

## 2023-03-24 NOTE — PROGRESS NOTES
Is   Farhan Steve DO   [address] [place]  [address]      PATIENT NAME: Anamika Banda  : 1975  DATE: 3/24/23  MRN: [id number]      Billing Provider: Farhan Steve DO  Level of Service: [place] OFFICE/OUTPT VISIT, EST, LEVL IV, 30-39 MIN  Patient PCP Information       Provider PCP Type    Farhan Steve DO General            Reason for Visit / Chief Complaint: Color Me Healthy (#1 of 2 for Diabetes and cholesterol )       Update PCP  Update Chief Complaint         History of Present Illness / Problem Focused Workflow     Anamika Banda presents to the clinic with Color Me Healthy (#1 of 2 for Diabetes and cholesterol )     Patient is in today for routine follow-up on color me healthy.  She does have a history of lupus however she did see the rheumatologist who felt that instead of lupus that she had autoimmune disease related to her breast implants.  Patient has saline implants and not silicon implants.  Patient was having some reaction to Plaquenil so the rheumatologist switched her to methotrexate which she is been on previously responded well to.  Patient denies any chest pain shortness in breath palpitations PND orthopnea.  No new skin rashes or lesions.      Review of Systems     Review of Systems   Constitutional:  Negative for activity change, appetite change, chills, fatigue and fever.   HENT:  Negative for nasal congestion, ear discharge, ear pain, mouth dryness, mouth sores, postnasal drip, sinus pressure/congestion, sore throat and voice change.    Eyes:  Negative for pain, discharge, redness, itching and visual disturbance.   Respiratory:  Negative for apnea, cough, chest tightness, shortness of breath and wheezing.    Cardiovascular:  Negative for chest pain, palpitations and leg swelling.   Gastrointestinal:  Negative for abdominal distention, abdominal pain, anal bleeding, blood in stool, change in bowel habit, constipation, diarrhea, nausea, vomiting, reflux  and change in bowel habit.   Endocrine: Negative for cold intolerance, heat intolerance, polydipsia, polyphagia and polyuria.   Genitourinary:  Negative for difficulty urinating, enuresis, frequency, genital sores, hematuria, hot flashes, menstrual irregularity, urgency and vaginal dryness.   Musculoskeletal:  Negative for arthralgias, back pain, gait problem, leg pain, myalgias and neck pain.   Integumentary:  Negative for rash, mole/lesion, breast mass, breast discharge and breast tenderness.   Allergic/Immunologic: Negative for environmental allergies and food allergies.   Neurological:  Negative for dizziness, vertigo, tremors, seizures, syncope, facial asymmetry, speech difficulty, weakness, light-headedness, numbness, headaches, coordination difficulties, memory loss and coordination difficulties.   Hematological:  Negative for adenopathy. Does not bruise/bleed easily.   Psychiatric/Behavioral:  Negative for agitation, behavioral problems, confusion, decreased concentration, dysphoric mood, hallucinations, self-injury, sleep disturbance and suicidal ideas. The patient is not nervous/anxious and is not hyperactive.    Breast: Negative for mass and tenderness    Medical / Social / Family History     Past Medical History:   Diagnosis Date    Arthritis     Insomnia     Lupus        Past Surgical History:   Procedure Laterality Date    AUGMENTATION OF BREAST Bilateral      SECTION         Social History  Ms.  reports that she has never smoked. She has never used smokeless tobacco. She reports current alcohol use of about 1.0 standard drink per week. She reports that she does not use drugs.    Family History  Ms.'s family history includes Cancer in her father; Hypertension in her mother.    Medications and Allergies     Medications  Outpatient Medications Marked as Taking for the 3/24/23 encounter (Office Visit) with Farhan Steve, DO   Medication Sig Dispense Refill    atorvastatin (LIPITOR) 20 MG tablet  Take 1 tablet (20 mg total) by mouth once daily. 90 tablet 3    folic acid (FOLVITE) 1 MG tablet Take 1 tablet (1 mg total) by mouth once daily. 100 tablet 1    methotrexate 2.5 MG Tab Take 6 tablets (15 mg total) by mouth once a week. 72 tablet 0    [DISCONTINUED] ergocalciferol (ERGOCALCIFEROL) 50,000 unit Cap TAKE ONE CAPSULE ONCE WEEKLY. 12 capsule 1       Allergies  Review of patient's allergies indicates:  No Known Allergies    Physical Examination     Vitals:    03/24/23 0836   BP: 118/86   Pulse: 75   Resp: 17   Temp: 97.9 °F (36.6 °C)     Physical Exam  Vitals reviewed.   Constitutional:       General: She is not in acute distress.     Appearance: Normal appearance. She is normal weight.   HENT:      Head: Normocephalic and atraumatic.      Nose: Nose normal.      Mouth/Throat:      Mouth: Mucous membranes are moist.      Pharynx: Oropharynx is clear. No oropharyngeal exudate or posterior oropharyngeal erythema.   Eyes:      General: No scleral icterus.     Extraocular Movements: Extraocular movements intact.      Conjunctiva/sclera: Conjunctivae normal.      Pupils: Pupils are equal, round, and reactive to light.   Neck:      Vascular: No carotid bruit.   Cardiovascular:      Rate and Rhythm: Normal rate and regular rhythm.      Pulses: Normal pulses.      Heart sounds: Normal heart sounds. No murmur heard.    No gallop.   Pulmonary:      Effort: Pulmonary effort is normal.      Breath sounds: Normal breath sounds. No wheezing.   Abdominal:      General: Abdomen is flat. Bowel sounds are normal.      Palpations: Abdomen is soft.   Musculoskeletal:         General: Normal range of motion.      Cervical back: Normal range of motion and neck supple.      Right lower leg: No edema.      Left lower leg: No edema.   Lymphadenopathy:      Cervical: No cervical adenopathy.   Skin:     General: Skin is warm and dry.      Capillary Refill: Capillary refill takes less than 2 seconds.      Coloration: Skin is not  jaundiced.      Findings: No lesion or rash.   Neurological:      General: No focal deficit present.      Mental Status: She is alert and oriented to person, place, and time. Mental status is at baseline.      Cranial Nerves: No cranial nerve deficit.      Sensory: No sensory deficit.      Motor: No weakness.      Coordination: Coordination normal.      Gait: Gait normal.      Deep Tendon Reflexes: Reflexes normal.   Psychiatric:         Mood and Affect: Mood normal.         Behavior: Behavior normal.         Thought Content: Thought content normal.         Judgment: Judgment normal.             Lab Results   Component Value Date    WBC 5.78 03/24/2023    HGB 11.1 (L) 03/24/2023    HCT 35.8 (L) 03/24/2023    MCV 85.2 03/24/2023     03/24/2023          Sodium   Date Value Ref Range Status   03/24/2023 140 136 - 145 mmol/L Final     Potassium   Date Value Ref Range Status   03/24/2023 4.2 3.5 - 5.1 mmol/L Final     Chloride   Date Value Ref Range Status   03/24/2023 106 98 - 107 mmol/L Final     CO2   Date Value Ref Range Status   03/24/2023 27 21 - 32 mmol/L Final     Glucose   Date Value Ref Range Status   03/24/2023 85 74 - 106 mg/dL Final     BUN   Date Value Ref Range Status   03/24/2023 14 7 - 18 mg/dL Final     Creatinine   Date Value Ref Range Status   03/24/2023 0.67 0.55 - 1.02 mg/dL Final     Calcium   Date Value Ref Range Status   03/24/2023 9.0 8.5 - 10.1 mg/dL Final     Total Protein   Date Value Ref Range Status   03/24/2023 6.9 6.4 - 8.2 g/dL Final     Albumin   Date Value Ref Range Status   03/24/2023 4.0 3.5 - 5.0 g/dL Final     Bilirubin, Total   Date Value Ref Range Status   03/24/2023 0.5 >0.0 - 1.2 mg/dL Final     Alk Phos   Date Value Ref Range Status   03/24/2023 79 39 - 100 U/L Final     AST   Date Value Ref Range Status   03/24/2023 17 15 - 37 U/L Final     ALT   Date Value Ref Range Status   03/24/2023 27 13 - 56 U/L Final     Anion Gap   Date Value Ref Range Status   03/24/2023 11 7  - 16 mmol/L Final     eGFR   Date Value Ref Range Status   07/08/2022 76 >=60 mL/min/1.73m² Final      Mammo Digital Screening Bilat  Narrative: Result:   Mammo Digital Screening Bilat     History:  Patient is 47 y.o. and is seen for a screening mammogram.    Films Compared:  No prior studies available for comparison.     Findings:  The breasts have scattered areas of fibroglandular density. There is no   evidence of suspicious masses, calcifications, or other abnormal findings.     Subpectoral saline breast implants are intact.  Impression: Bilateral  There is no mammographic evidence of malignancy.    BI-RADS Category:   Overall: 1 - Negative       Recommendation:  Routine screening mammogram in 1 year is recommended.     Procedures   Lab Results   Component Value Date    CHOL 134 03/24/2023    CHOL 260 (H) 09/29/2022     Lab Results   Component Value Date    HDL 45 03/24/2023    HDL 45 09/29/2022     Lab Results   Component Value Date    LDLCALC 63 03/24/2023    LDLCALC 179 09/29/2022     Lab Results   Component Value Date    TRIG 129 03/24/2023    TRIG 181 (H) 09/29/2022     Lab Results   Component Value Date    CHOLHDL 3.0 03/24/2023    CHOLHDL 5.8 09/29/2022      Lab Results   Component Value Date    HGBA1C 5.4 03/24/2023      Lab Results   Component Value Date    TSH 1.060 07/08/2022    N1PLKZP 10.7 07/08/2022      Assessment and Plan (including Health Maintenance)      Problem List  Smart Sets  Document Outside HM   :    Plan:         Health Maintenance Due   Topic Date Due    Cervical Cancer Screening  Never done    TETANUS VACCINE  Never done       Problem List Items Addressed This Visit          Cardiac/Vascular    Screening for lipoid disorders    Current Assessment & Plan     Screen for hyperlipidemia.  Patient currently on atorvastatin.  Continue current medicines.           Relevant Orders    Lipid Panel (Completed)    Mixed hyperlipidemia - Primary    Current Assessment & Plan     History of  hyperlipidemia currently on atorvastatin 20 mg daily.  Last cholesterol was 260 and LDL was 179.  Recheck lipid level today.  Follow-up every 6 months.  Continue atorvastatin 20 mg daily but may need increase dose.                Immunology/Multi System    Disease of immune system    Overview     Autoimmune syndrome inflammatory syndrome           Current Assessment & Plan     Patient did see Rheumatology and felt that she had autoimmune syndrome inflammatory type or MARIBEL.  Because of some skin changes will switch her to methotrexate and monitor her B12 folate levels as well as her liver functions.  Labs today.  Follow-up with us every 3 months.           Relevant Orders    (In Office Administered) Pneumococcal Conjugate Vaccine (20 Valent) (IM) (Completed)    CBC Auto Differential (Completed)    Comprehensive Metabolic Panel (Completed)       Oncology    Malignant melanoma of torso excluding breast    Current Assessment & Plan     No new lesions noted.  Patient does see Dermatology every 6 months.  No changes at this time.              Endocrine    Vitamin B 12 deficiency    Current Assessment & Plan     History of B12 deficiency so will give her a 1000 mcg of B12 today.  Will check her B12 and folate levels and give her B12 monthly basis.  Follow-up every 6 months.           Relevant Orders    Vitamin B12 & Folate (Completed)    Vitamin D deficiency    Current Assessment & Plan     History of vitamin-D deficiency.  Will refill her medication.  Recheck her levels on a yearly basis.  Follow-up in 1 year.           Relevant Medications    ergocalciferol (ERGOCALCIFEROL) 50,000 unit Cap       Other    Insomnia    Current Assessment & Plan     History of insomnia.  We did discussed sleep hygiene with the patient.  Will continue on her Ambien 5-10 mg at HS.  Follow-up every 6 months           Relevant Medications    zolpidem (AMBIEN) 5 MG Tab     Other Visit Diagnoses       Screening for diabetes mellitus         Relevant Orders    Hemoglobin A1C (Completed)    Microalbumin/Creatinine Ratio, Urine (Completed)    Encounter for immunization                Health Maintenance Topics with due status: Not Due       Topic Last Completion Date    Colorectal Cancer Screening 10/05/2022    Mammogram 01/25/2023    Hemoglobin A1c (Diabetic Prevention Screening) 03/24/2023    Lipid Panel 03/24/2023       Future Appointments   Date Time Provider Department Center   6/21/2023  2:00 PM [facility]   9/28/2023  8:00 AM [facility]   1/31/2024  9:30 AM [facility]        Follow up in about 3 months (around 6/24/2023).     Signature:  DO [facility]  [address] [place] [address], [place]  [address]    Date of encounter: 3/24/23

## 2023-03-24 NOTE — ASSESSMENT & PLAN NOTE
Patient did see Rheumatology and felt that she had autoimmune syndrome inflammatory type or MARIBEL.  Because of some skin changes will switch her to methotrexate and monitor her B12 folate levels as well as her liver functions.  Labs today.  Follow-up with us every 3 months.

## 2023-03-24 NOTE — ASSESSMENT & PLAN NOTE
History of insomnia.  We did discussed sleep hygiene with the patient.  Will continue on her Ambien 5-10 mg at HS.  Follow-up every 6 months

## 2023-04-04 PROBLEM — E78.2 MIXED HYPERLIPIDEMIA: Status: ACTIVE | Noted: 2023-04-04

## 2023-04-04 NOTE — ASSESSMENT & PLAN NOTE
History of hyperlipidemia currently on atorvastatin 20 mg daily.  Last cholesterol was 260 and LDL was 179.  Recheck lipid level today.  Follow-up every 6 months.  Continue atorvastatin 20 mg daily but may need increase dose.

## 2023-05-16 DIAGNOSIS — H10.33 ACUTE BACTERIAL CONJUNCTIVITIS OF BOTH EYES: Primary | ICD-10-CM

## 2023-05-16 RX ORDER — TOBRAMYCIN 3 MG/ML
1 SOLUTION/ DROPS OPHTHALMIC EVERY 4 HOURS
Qty: 5 ML | Refills: 1 | Status: SHIPPED | OUTPATIENT
Start: 2023-05-16 | End: 2023-07-20

## 2023-07-10 PROBLEM — Z13.220 SCREENING FOR LIPOID DISORDERS: Status: RESOLVED | Noted: 2022-09-28 | Resolved: 2023-07-10

## 2023-07-20 ENCOUNTER — OFFICE VISIT (OUTPATIENT)
Dept: FAMILY MEDICINE | Facility: CLINIC | Age: 48
End: 2023-07-20
Payer: COMMERCIAL

## 2023-07-20 VITALS
HEIGHT: 63 IN | TEMPERATURE: 99 F | WEIGHT: 144.19 LBS | BODY MASS INDEX: 25.55 KG/M2 | SYSTOLIC BLOOD PRESSURE: 124 MMHG | OXYGEN SATURATION: 99 % | RESPIRATION RATE: 18 BRPM | DIASTOLIC BLOOD PRESSURE: 78 MMHG | HEART RATE: 76 BPM

## 2023-07-20 DIAGNOSIS — E78.5 HYPERLIPIDEMIA, UNSPECIFIED HYPERLIPIDEMIA TYPE: Primary | ICD-10-CM

## 2023-07-20 DIAGNOSIS — Z78.9 HISTORY OF MEASLES, MUMPS, RUBELLA (MMR) VACCINATION UNKNOWN: ICD-10-CM

## 2023-07-20 DIAGNOSIS — Z78.9 UNKNOWN VARICELLA VACCINATION STATUS: ICD-10-CM

## 2023-07-20 DIAGNOSIS — E53.8 VITAMIN B 12 DEFICIENCY: ICD-10-CM

## 2023-07-20 PROBLEM — H10.33 ACUTE BACTERIAL CONJUNCTIVITIS OF BOTH EYES: Status: RESOLVED | Noted: 2023-05-16 | Resolved: 2023-07-20

## 2023-07-20 LAB
ALBUMIN SERPL BCP-MCNC: 3.9 G/DL (ref 3.5–5)
ALBUMIN/GLOB SERPL: 1.1 {RATIO}
ALP SERPL-CCNC: 90 U/L (ref 39–100)
ALT SERPL W P-5'-P-CCNC: 39 U/L (ref 13–56)
ANION GAP SERPL CALCULATED.3IONS-SCNC: 10 MMOL/L (ref 7–16)
AST SERPL W P-5'-P-CCNC: 20 U/L (ref 15–37)
BILIRUB SERPL-MCNC: 1.1 MG/DL (ref ?–1.2)
BUN SERPL-MCNC: 12 MG/DL (ref 7–18)
BUN/CREAT SERPL: 15 (ref 6–20)
CALCIUM SERPL-MCNC: 9.2 MG/DL (ref 8.5–10.1)
CHLORIDE SERPL-SCNC: 107 MMOL/L (ref 98–107)
CHOLEST SERPL-MCNC: 205 MG/DL (ref 0–200)
CHOLEST/HDLC SERPL: 4.6 {RATIO}
CO2 SERPL-SCNC: 25 MMOL/L (ref 21–32)
CREAT SERPL-MCNC: 0.8 MG/DL (ref 0.55–1.02)
CREAT UR-MCNC: 119 MG/DL (ref 28–219)
EGFR (NO RACE VARIABLE) (RUSH/TITUS): 92 ML/MIN/1.73M2
EST. AVERAGE GLUCOSE BLD GHB EST-MCNC: 74 MG/DL
FOLATE SERPL-MCNC: 12.4 NG/ML (ref 3.1–17.5)
GLOBULIN SER-MCNC: 3.4 G/DL (ref 2–4)
GLUCOSE SERPL-MCNC: 73 MG/DL (ref 74–106)
HBA1C MFR BLD HPLC: 4.8 % (ref 4.5–6.6)
HDLC SERPL-MCNC: 45 MG/DL (ref 40–60)
LDLC SERPL CALC-MCNC: 131 MG/DL
LDLC/HDLC SERPL: 2.9 {RATIO}
MICROALBUMIN UR-MCNC: 0.6 MG/DL (ref 0–2.8)
MICROALBUMIN/CREAT RATIO PNL UR: 5 MG/G (ref 0–30)
NONHDLC SERPL-MCNC: 160 MG/DL
POTASSIUM SERPL-SCNC: 3.8 MMOL/L (ref 3.5–5.1)
PROT SERPL-MCNC: 7.3 G/DL (ref 6.4–8.2)
RUBV IGG SER-ACNC: 3.5 IU/ML
SODIUM SERPL-SCNC: 138 MMOL/L (ref 136–145)
TRIGL SERPL-MCNC: 145 MG/DL (ref 35–150)
VIT B12 SERPL-MCNC: 383 PG/ML (ref 193–986)
VLDLC SERPL-MCNC: 29 MG/DL

## 2023-07-20 PROCEDURE — 80053 COMPREHEN METABOLIC PANEL: CPT | Mod: ,,, | Performed by: CLINICAL MEDICAL LABORATORY

## 2023-07-20 PROCEDURE — 82570 ASSAY OF URINE CREATININE: CPT | Mod: ,,, | Performed by: CLINICAL MEDICAL LABORATORY

## 2023-07-20 PROCEDURE — 86762 RUBELLA ANTIBODY: CPT | Mod: ,,, | Performed by: CLINICAL MEDICAL LABORATORY

## 2023-07-20 PROCEDURE — 1160F RVW MEDS BY RX/DR IN RCRD: CPT | Mod: ,,,

## 2023-07-20 PROCEDURE — 3008F BODY MASS INDEX DOCD: CPT | Mod: ,,,

## 2023-07-20 PROCEDURE — 3074F PR MOST RECENT SYSTOLIC BLOOD PRESSURE < 130 MM HG: ICD-10-PCS | Mod: ,,,

## 2023-07-20 PROCEDURE — 82607 VITAMIN B12/FOLATE, SERUM PANEL: ICD-10-PCS | Mod: ,,, | Performed by: CLINICAL MEDICAL LABORATORY

## 2023-07-20 PROCEDURE — 82746 ASSAY OF FOLIC ACID SERUM: CPT | Mod: ,,, | Performed by: CLINICAL MEDICAL LABORATORY

## 2023-07-20 PROCEDURE — 86765 RUBEOLA ANTIBODY: CPT | Mod: ,,, | Performed by: CLINICAL MEDICAL LABORATORY

## 2023-07-20 PROCEDURE — 82746 VITAMIN B12/FOLATE, SERUM PANEL: ICD-10-PCS | Mod: ,,, | Performed by: CLINICAL MEDICAL LABORATORY

## 2023-07-20 PROCEDURE — 83036 HEMOGLOBIN A1C: ICD-10-PCS | Mod: ,,, | Performed by: CLINICAL MEDICAL LABORATORY

## 2023-07-20 PROCEDURE — 86735 MUMPS, IGG SCREEN: ICD-10-PCS | Mod: ,,, | Performed by: CLINICAL MEDICAL LABORATORY

## 2023-07-20 PROCEDURE — 3078F PR MOST RECENT DIASTOLIC BLOOD PRESSURE < 80 MM HG: ICD-10-PCS | Mod: ,,,

## 2023-07-20 PROCEDURE — 1160F PR REVIEW ALL MEDS BY PRESCRIBER/CLIN PHARMACIST DOCUMENTED: ICD-10-PCS | Mod: ,,,

## 2023-07-20 PROCEDURE — 86735 MUMPS ANTIBODY: CPT | Mod: ,,, | Performed by: CLINICAL MEDICAL LABORATORY

## 2023-07-20 PROCEDURE — 86787 VARICELLA-ZOSTER ANTIBODY: CPT | Mod: ,,, | Performed by: CLINICAL MEDICAL LABORATORY

## 2023-07-20 PROCEDURE — 3044F PR MOST RECENT HEMOGLOBIN A1C LEVEL <7.0%: ICD-10-PCS | Mod: ,,,

## 2023-07-20 PROCEDURE — 1159F MED LIST DOCD IN RCRD: CPT | Mod: ,,,

## 2023-07-20 PROCEDURE — 1159F PR MEDICATION LIST DOCUMENTED IN MEDICAL RECORD: ICD-10-PCS | Mod: ,,,

## 2023-07-20 PROCEDURE — 82043 UR ALBUMIN QUANTITATIVE: CPT | Mod: ,,, | Performed by: CLINICAL MEDICAL LABORATORY

## 2023-07-20 PROCEDURE — 3044F HG A1C LEVEL LT 7.0%: CPT | Mod: ,,,

## 2023-07-20 PROCEDURE — 99214 OFFICE O/P EST MOD 30 MIN: CPT | Mod: ,,,

## 2023-07-20 PROCEDURE — 99214 PR OFFICE/OUTPT VISIT, EST, LEVL IV, 30-39 MIN: ICD-10-PCS | Mod: ,,,

## 2023-07-20 PROCEDURE — 86765 RUBEOLA ANTIBODY IGG: ICD-10-PCS | Mod: ,,, | Performed by: CLINICAL MEDICAL LABORATORY

## 2023-07-20 PROCEDURE — 3074F SYST BP LT 130 MM HG: CPT | Mod: ,,,

## 2023-07-20 PROCEDURE — 82607 VITAMIN B-12: CPT | Mod: ,,, | Performed by: CLINICAL MEDICAL LABORATORY

## 2023-07-20 PROCEDURE — 82570 MICROALBUMIN / CREATININE RATIO URINE: ICD-10-PCS | Mod: ,,, | Performed by: CLINICAL MEDICAL LABORATORY

## 2023-07-20 PROCEDURE — 80061 LIPID PANEL: ICD-10-PCS | Mod: ,,, | Performed by: CLINICAL MEDICAL LABORATORY

## 2023-07-20 PROCEDURE — 86787 VARICELLA ZOSTER ANTIBODY, IGG: ICD-10-PCS | Mod: ,,, | Performed by: CLINICAL MEDICAL LABORATORY

## 2023-07-20 PROCEDURE — 82043 MICROALBUMIN / CREATININE RATIO URINE: ICD-10-PCS | Mod: ,,, | Performed by: CLINICAL MEDICAL LABORATORY

## 2023-07-20 PROCEDURE — 83036 HEMOGLOBIN GLYCOSYLATED A1C: CPT | Mod: ,,, | Performed by: CLINICAL MEDICAL LABORATORY

## 2023-07-20 PROCEDURE — 80053 COMPREHENSIVE METABOLIC PANEL: ICD-10-PCS | Mod: ,,, | Performed by: CLINICAL MEDICAL LABORATORY

## 2023-07-20 PROCEDURE — 80061 LIPID PANEL: CPT | Mod: ,,, | Performed by: CLINICAL MEDICAL LABORATORY

## 2023-07-20 PROCEDURE — 3008F PR BODY MASS INDEX (BMI) DOCUMENTED: ICD-10-PCS | Mod: ,,,

## 2023-07-20 PROCEDURE — 3078F DIAST BP <80 MM HG: CPT | Mod: ,,,

## 2023-07-20 PROCEDURE — 86762 RUBELLA ANTIBODY, IGG: ICD-10-PCS | Mod: ,,, | Performed by: CLINICAL MEDICAL LABORATORY

## 2023-07-20 NOTE — PROGRESS NOTES
PIOTR NARANJO   [place] [id number] [facility] [address]  [place]  [address]      PATIENT NAME: Anamika Banda  : 1975  DATE: 23  MRN: [id number]      Billing Provider: PIOTR NARANJO  Level of Service: CT OFFICE/OUTPT VISIT, EST, LEVL IV, 30-39 MIN  Patient PCP Information       Provider PCP Type    Farhan Steve DO General            Reason for Visit / Chief Complaint: Color Me Healthy (Here for Color Me Heathy visit #2.)         History of Present Illness / Problem Focused Workflow     Anamika Banda presents to the clinic with Color Me Healthy (Here for Color Me Heathy visit #2.)     47 y/o female presents to clinic for Color Me Healthy. She denies any concerns or problems at present      Review of Systems     @Review of Systems   Constitutional:  Negative for chills, fatigue, fever and unexpected weight change.   HENT:  Negative for nasal congestion, ear pain, sinus pressure/congestion and sore throat.    Eyes:  Negative for pain.   Respiratory:  Negative for cough, chest tightness, shortness of breath and wheezing.    Cardiovascular:  Negative for chest pain and palpitations.   Gastrointestinal:  Negative for abdominal pain, blood in stool, change in bowel habit, nausea, vomiting, reflux and change in bowel habit.   Endocrine: Negative for polydipsia, polyphagia and polyuria.   Genitourinary:  Negative for difficulty urinating, dysuria, flank pain, frequency, hematuria and urgency.   Musculoskeletal:  Negative for back pain, joint swelling and neck pain.   Neurological:  Negative for dizziness, seizures, weakness, light-headedness, numbness and headaches.   Psychiatric/Behavioral:  Negative for suicidal ideas.      Medical / Social / Family History     Past Medical History:   Diagnosis Date    Arthritis     Insomnia     Lupus        Past Surgical History:   Procedure Laterality Date    AUGMENTATION OF BREAST Bilateral      SECTION         Social History  MsDerek  reports that  she has never smoked. She has never been exposed to tobacco smoke. She has never used smokeless tobacco. She reports that she does not currently use alcohol after a past usage of about 1.0 standard drink per week. She reports that she does not use drugs.    Family History  Ms.'s family history includes Cancer in her father; Hypertension in her mother.    Medications and Allergies     Medications  Outpatient Medications Marked as Taking for the 7/20/23 encounter (Office Visit) with PIOTR Coleman   Medication Sig Dispense Refill    atorvastatin (LIPITOR) 20 MG tablet Take 1 tablet (20 mg total) by mouth once daily. 90 tablet 3    ergocalciferol (ERGOCALCIFEROL) 50,000 unit Cap TAKE ONE CAPSULE ONCE WEEKLY. 12 capsule 1    folic acid (FOLVITE) 1 MG tablet Take 1 tablet (1 mg total) by mouth once daily. 100 tablet 1    zolpidem (AMBIEN) 5 MG Tab Take 1 tablet (5 mg total) by mouth nightly as needed (sleep). 30 tablet 2       Allergies  Review of patient's allergies indicates:  No Known Allergies    Physical Examination     Vitals:    07/20/23 1049   BP: 124/78   Pulse: 76   Resp: 18   Temp: 98.5 °F (36.9 °C)     Physical Exam  Vitals and nursing note reviewed.   Constitutional:       General: She is not in acute distress.     Appearance: Normal appearance. She is well-developed.   HENT:      Head: Normocephalic.      Right Ear: Tympanic membrane, ear canal and external ear normal.      Left Ear: Tympanic membrane, ear canal and external ear normal.      Nose: Nose normal.      Mouth/Throat:      Lips: Pink.      Mouth: Mucous membranes are moist.      Pharynx: Oropharynx is clear. Uvula midline.   Eyes:      Pupils: Pupils are equal, round, and reactive to light.   Neck:      Vascular: No carotid bruit.   Cardiovascular:      Rate and Rhythm: Normal rate and regular rhythm.      Heart sounds: Normal heart sounds, S1 normal and S2 normal. No murmur heard.    No friction rub. No gallop.   Pulmonary:      Effort:  Pulmonary effort is normal. No respiratory distress.      Breath sounds: Normal breath sounds. No decreased breath sounds, wheezing, rhonchi or rales.   Abdominal:      General: Bowel sounds are normal.      Palpations: Abdomen is soft.      Tenderness: There is no abdominal tenderness.   Musculoskeletal:         General: No swelling or tenderness. Normal range of motion.      Cervical back: Normal range of motion.   Skin:     General: Skin is warm and dry.      Capillary Refill: Capillary refill takes less than 2 seconds.   Neurological:      Mental Status: She is alert and oriented to person, place, and time.      Sensory: Sensation is intact.      Motor: Motor function is intact.      Coordination: Coordination is intact.      Gait: Gait is intact.   Psychiatric:         Attention and Perception: Attention normal.         Mood and Affect: Mood normal.         Behavior: Behavior normal. Behavior is cooperative.         Thought Content: Thought content does not include homicidal or suicidal ideation.             Lab Results   Component Value Date    WBC 5.78 03/24/2023    HGB 11.1 (L) 03/24/2023    HCT 35.8 (L) 03/24/2023    MCV 85.2 03/24/2023     03/24/2023        CMP  Sodium   Date Value Ref Range Status   07/20/2023 138 136 - 145 mmol/L Final     Potassium   Date Value Ref Range Status   07/20/2023 3.8 3.5 - 5.1 mmol/L Final     Chloride   Date Value Ref Range Status   07/20/2023 107 98 - 107 mmol/L Final     CO2   Date Value Ref Range Status   07/20/2023 25 21 - 32 mmol/L Final     Glucose   Date Value Ref Range Status   07/20/2023 73 (L) 74 - 106 mg/dL Final     BUN   Date Value Ref Range Status   07/20/2023 12 7 - 18 mg/dL Final     Creatinine   Date Value Ref Range Status   07/20/2023 0.80 0.55 - 1.02 mg/dL Final     Calcium   Date Value Ref Range Status   07/20/2023 9.2 8.5 - 10.1 mg/dL Final     Total Protein   Date Value Ref Range Status   07/20/2023 7.3 6.4 - 8.2 g/dL Final     Albumin   Date  "Value Ref Range Status   07/20/2023 3.9 3.5 - 5.0 g/dL Final     Bilirubin, Total   Date Value Ref Range Status   07/20/2023 1.1 >0.0 - 1.2 mg/dL Final     Alk Phos   Date Value Ref Range Status   07/20/2023 90 39 - 100 U/L Final     AST   Date Value Ref Range Status   07/20/2023 20 15 - 37 U/L Final     ALT   Date Value Ref Range Status   07/20/2023 39 13 - 56 U/L Final     Anion Gap   Date Value Ref Range Status   07/20/2023 10 7 - 16 mmol/L Final     eGFR   Date Value Ref Range Status   07/20/2023 92 >=60 mL/min/1.73m2 Final     Procedures   Assessment and Plan (including Health Maintenance)   :    Plan:           Problem List Items Addressed This Visit          Cardiac/Vascular    Hyperlipidemia - Primary    Current Assessment & Plan     Lipid panel obtained at today's visit. Goal LDL is less than 70. Continue current meds and low fat/low cholesterol diet with increased exercise as tolerated. Will follow up with labs. Patient to follow up in 3 months or as needed    A few changes in your diet can reduce cholesterol and improve your heart health. Saturated fats, found primarily in red meat and full-fat dairy products, raise your total cholesterol. Decreasing your consumption of saturated fats can reduce your low-density lipoprotein (LDL) cholesterol. rans fats, sometimes listed on food labels as "partially hydrogenated vegetable oil," are often used in margarines and store-bought cookies, crackers and cakes.     Trans fats raise overall cholesterol levels. Omega-3 fatty acids don't affect LDL cholesterol. But they have other heart-healthy benefits, including reducing blood pressure. Foods with omega-3 fatty acids include salmon, mackerel, herring, walnuts and flaxseeds.Soluble fiber can reduce the absorption of cholesterol into your bloodstream. Soluble fiber is found in such foods as oatmeal, kidney beans, [place] sprouts, apples and pears.  at least 30 minutes of exercise five times a week or vigorous aerobic " activity for 20 minutes three times a week if tolerated.          Relevant Orders    Lipid Panel (Completed)    Hemoglobin A1C (Completed)    Microalbumin/Creatinine Ratio, Urine (Completed)    Comprehensive Metabolic Panel (Completed)       ID    Unknown varicella vaccination status    Current Assessment & Plan     Titers obtained today. Will call pt with results         Relevant Orders    Varicella Zoster Antibody, IgG       Endocrine    Vitamin B 12 deficiency    Current Assessment & Plan     Hx of B12 deficiency. Lab work obtained today         Relevant Orders    Vitamin B12 & Folate (Completed)       Health Maintenance Topics with due status: Not Due       Topic Last Completion Date    Colorectal Cancer Screening 10/05/2022    Mammogram 01/25/2023    Hemoglobin A1c (Diabetic Prevention Screening) 07/20/2023    Lipid Panel 07/20/2023    Influenza Vaccine Not Due       Future Appointments   Date Time Provider Department Center   9/28/2023 10:20 AM PIOTR Coleman [facility]   1/31/2024  9:30 AM [facility]        Health Maintenance Due   Topic Date Due    Hepatitis C Screening  Never done    Cervical Cancer Screening  Never done    TETANUS VACCINE  Never done        Follow up in about 3 months (around 10/20/2023).     Signature:  PIOTR COLEMAN  [facility]  [address] [address] [place]  [address]    Date of encounter: 7/20/23

## 2023-07-21 ENCOUNTER — PATIENT MESSAGE (OUTPATIENT)
Dept: ADMINISTRATIVE | Facility: HOSPITAL | Age: 48
End: 2023-07-21

## 2023-07-26 PROBLEM — E78.5 HYPERLIPIDEMIA: Status: ACTIVE | Noted: 2023-04-04

## 2023-07-26 PROBLEM — Z78.9 HISTORY OF MEASLES, MUMPS, RUBELLA (MMR) VACCINATION UNKNOWN: Status: ACTIVE | Noted: 2023-07-26

## 2023-07-26 LAB
MEASLES IGG INDEX: 2.59
MEV IGG SER QL: POSITIVE
MUMPS AB IGG INDEX: 1.29
MUV IGG SER QL IA: POSITIVE
VARICELLA ZOSTER, BLOOD: POSITIVE
VZV IGG INDEX: 2.36 (ref 0–0.9)

## 2023-07-26 NOTE — ASSESSMENT & PLAN NOTE
"Lipid panel obtained at today's visit. Goal LDL is less than 70. Continue current meds and low fat/low cholesterol diet with increased exercise as tolerated. Will follow up with labs. Patient to follow up in 3 months or as needed    A few changes in your diet can reduce cholesterol and improve your heart health. Saturated fats, found primarily in red meat and full-fat dairy products, raise your total cholesterol. Decreasing your consumption of saturated fats can reduce your low-density lipoprotein (LDL) cholesterol. rans fats, sometimes listed on food labels as "partially hydrogenated vegetable oil," are often used in margarines and store-bought cookies, crackers and cakes.     Trans fats raise overall cholesterol levels. Omega-3 fatty acids don't affect LDL cholesterol. But they have other heart-healthy benefits, including reducing blood pressure. Foods with omega-3 fatty acids include salmon, mackerel, herring, walnuts and flaxseeds.Soluble fiber can reduce the absorption of cholesterol into your bloodstream. Soluble fiber is found in such foods as oatmeal, kidney beans, [place] sprouts, apples and pears.  at least 30 minutes of exercise five times a week or vigorous aerobic activity for 20 minutes three times a week if tolerated.   "

## 2023-09-29 ENCOUNTER — PATIENT MESSAGE (OUTPATIENT)
Dept: ADMINISTRATIVE | Facility: HOSPITAL | Age: 48
End: 2023-09-29

## 2023-12-22 ENCOUNTER — OFFICE VISIT (OUTPATIENT)
Dept: FAMILY MEDICINE | Facility: CLINIC | Age: 48
End: 2023-12-22
Payer: COMMERCIAL

## 2023-12-22 VITALS
HEART RATE: 67 BPM | DIASTOLIC BLOOD PRESSURE: 86 MMHG | TEMPERATURE: 98 F | OXYGEN SATURATION: 98 % | RESPIRATION RATE: 19 BRPM | WEIGHT: 150 LBS | SYSTOLIC BLOOD PRESSURE: 123 MMHG | HEIGHT: 63 IN | BODY MASS INDEX: 26.58 KG/M2

## 2023-12-22 DIAGNOSIS — E78.5 HYPERLIPIDEMIA, UNSPECIFIED HYPERLIPIDEMIA TYPE: ICD-10-CM

## 2023-12-22 DIAGNOSIS — E55.9 VITAMIN D DEFICIENCY: ICD-10-CM

## 2023-12-22 DIAGNOSIS — G43.009 MIGRAINE WITHOUT AURA AND WITHOUT STATUS MIGRAINOSUS, NOT INTRACTABLE: ICD-10-CM

## 2023-12-22 DIAGNOSIS — T85.9XXS DISORDER OF BREAST IMPLANT, SEQUELA: ICD-10-CM

## 2023-12-22 DIAGNOSIS — F51.01 PRIMARY INSOMNIA: ICD-10-CM

## 2023-12-22 DIAGNOSIS — C43.59 MALIGNANT MELANOMA OF TORSO EXCLUDING BREAST: Primary | ICD-10-CM

## 2023-12-22 DIAGNOSIS — E53.8 VITAMIN B 12 DEFICIENCY: ICD-10-CM

## 2023-12-22 DIAGNOSIS — M32.9 LUPUS: ICD-10-CM

## 2023-12-22 DIAGNOSIS — E78.2 MIXED HYPERLIPIDEMIA: ICD-10-CM

## 2023-12-22 LAB
ALBUMIN SERPL BCP-MCNC: 3.8 G/DL (ref 3.5–5)
ALBUMIN/GLOB SERPL: 1.2 {RATIO}
ALP SERPL-CCNC: 86 U/L (ref 39–100)
ALT SERPL W P-5'-P-CCNC: 48 U/L (ref 13–56)
ANION GAP SERPL CALCULATED.3IONS-SCNC: 9 MMOL/L (ref 7–16)
AST SERPL W P-5'-P-CCNC: 29 U/L (ref 15–37)
BASOPHILS # BLD AUTO: 0.03 K/UL (ref 0–0.2)
BASOPHILS NFR BLD AUTO: 0.6 % (ref 0–1)
BILIRUB SERPL-MCNC: 0.9 MG/DL (ref ?–1.2)
BUN SERPL-MCNC: 13 MG/DL (ref 7–18)
BUN/CREAT SERPL: 16 (ref 6–20)
CALCIUM SERPL-MCNC: 8.6 MG/DL (ref 8.5–10.1)
CHLORIDE SERPL-SCNC: 109 MMOL/L (ref 98–107)
CHOLEST SERPL-MCNC: 216 MG/DL (ref 0–200)
CHOLEST/HDLC SERPL: 5.1 {RATIO}
CO2 SERPL-SCNC: 25 MMOL/L (ref 21–32)
CREAT SERPL-MCNC: 0.79 MG/DL (ref 0.55–1.02)
CRP SERPL-MCNC: <0.29 MG/DL (ref 0–0.8)
DIFFERENTIAL METHOD BLD: ABNORMAL
EGFR (NO RACE VARIABLE) (RUSH/TITUS): 94 ML/MIN/1.73M2
EOSINOPHIL # BLD AUTO: 0.11 K/UL (ref 0–0.5)
EOSINOPHIL NFR BLD AUTO: 2.1 % (ref 1–4)
ERYTHROCYTE [DISTWIDTH] IN BLOOD BY AUTOMATED COUNT: 13.1 % (ref 11.5–14.5)
ERYTHROCYTE [SEDIMENTATION RATE] IN BLOOD BY WESTERGREN METHOD: 22 MM/HR (ref 0–20)
GLOBULIN SER-MCNC: 3.3 G/DL (ref 2–4)
GLUCOSE SERPL-MCNC: 100 MG/DL (ref 74–106)
HCT VFR BLD AUTO: 39.5 % (ref 38–47)
HDLC SERPL-MCNC: 42 MG/DL (ref 40–60)
HGB BLD-MCNC: 13.8 G/DL (ref 12–16)
IMM GRANULOCYTES # BLD AUTO: 0.01 K/UL (ref 0–0.04)
IMM GRANULOCYTES NFR BLD: 0.2 % (ref 0–0.4)
LDLC SERPL CALC-MCNC: 128 MG/DL
LDLC/HDLC SERPL: 3 {RATIO}
LYMPHOCYTES # BLD AUTO: 1.24 K/UL (ref 1–4.8)
LYMPHOCYTES NFR BLD AUTO: 23.7 % (ref 27–41)
MCH RBC QN AUTO: 32.4 PG (ref 27–31)
MCHC RBC AUTO-ENTMCNC: 34.9 G/DL (ref 32–36)
MCV RBC AUTO: 92.7 FL (ref 80–96)
MONOCYTES # BLD AUTO: 0.29 K/UL (ref 0–0.8)
MONOCYTES NFR BLD AUTO: 5.5 % (ref 2–6)
MPC BLD CALC-MCNC: 10.6 FL (ref 9.4–12.4)
NEUTROPHILS # BLD AUTO: 3.56 K/UL (ref 1.8–7.7)
NEUTROPHILS NFR BLD AUTO: 67.9 % (ref 53–65)
NONHDLC SERPL-MCNC: 174 MG/DL
NRBC # BLD AUTO: 0 X10E3/UL
NRBC, AUTO (.00): 0 %
PLATELET # BLD AUTO: 244 K/UL (ref 150–400)
POTASSIUM SERPL-SCNC: 3.8 MMOL/L (ref 3.5–5.1)
PROT SERPL-MCNC: 7.1 G/DL (ref 6.4–8.2)
RBC # BLD AUTO: 4.26 M/UL (ref 4.2–5.4)
RHEUMATOID FACT SER NEPH-ACNC: <10 IU/ML (ref 0–15)
SODIUM SERPL-SCNC: 139 MMOL/L (ref 136–145)
TRIGL SERPL-MCNC: 228 MG/DL (ref 35–150)
TSH SERPL DL<=0.005 MIU/L-ACNC: 0.72 UIU/ML (ref 0.36–3.74)
URATE SERPL-MCNC: 3.6 MG/DL (ref 2.6–6)
VLDLC SERPL-MCNC: 46 MG/DL
WBC # BLD AUTO: 5.24 K/UL (ref 4.5–11)

## 2023-12-22 PROCEDURE — 86235 ANTIEXTRACTABLE NUCLEAR AG: ICD-10-PCS | Mod: ,,, | Performed by: CLINICAL MEDICAL LABORATORY

## 2023-12-22 PROCEDURE — 86225 DNA ANTIBODY NATIVE: CPT | Mod: ,,, | Performed by: CLINICAL MEDICAL LABORATORY

## 2023-12-22 PROCEDURE — 3044F HG A1C LEVEL LT 7.0%: CPT | Mod: ,,, | Performed by: FAMILY MEDICINE

## 2023-12-22 PROCEDURE — 3044F PR MOST RECENT HEMOGLOBIN A1C LEVEL <7.0%: ICD-10-PCS | Mod: ,,, | Performed by: FAMILY MEDICINE

## 2023-12-22 PROCEDURE — 86225 ANTI-DNA ANTIBODY, DOUBLE-STRANDED: ICD-10-PCS | Mod: ,,, | Performed by: CLINICAL MEDICAL LABORATORY

## 2023-12-22 PROCEDURE — 86235 NUCLEAR ANTIGEN ANTIBODY: CPT | Mod: ,,, | Performed by: CLINICAL MEDICAL LABORATORY

## 2023-12-22 PROCEDURE — 80061 LIPID PANEL: CPT | Mod: ,,, | Performed by: CLINICAL MEDICAL LABORATORY

## 2023-12-22 PROCEDURE — 3074F PR MOST RECENT SYSTOLIC BLOOD PRESSURE < 130 MM HG: ICD-10-PCS | Mod: ,,, | Performed by: FAMILY MEDICINE

## 2023-12-22 PROCEDURE — 3079F DIAST BP 80-89 MM HG: CPT | Mod: ,,, | Performed by: FAMILY MEDICINE

## 2023-12-22 PROCEDURE — 84550 URIC ACID: ICD-10-PCS | Mod: ,,, | Performed by: CLINICAL MEDICAL LABORATORY

## 2023-12-22 PROCEDURE — 3074F SYST BP LT 130 MM HG: CPT | Mod: ,,, | Performed by: FAMILY MEDICINE

## 2023-12-22 PROCEDURE — 86431 RHEUMATOID QUANTITATIVE: ICD-10-PCS | Mod: ,,, | Performed by: CLINICAL MEDICAL LABORATORY

## 2023-12-22 PROCEDURE — 86140 C-REACTIVE PROTEIN: ICD-10-PCS | Mod: ,,, | Performed by: CLINICAL MEDICAL LABORATORY

## 2023-12-22 PROCEDURE — 3079F PR MOST RECENT DIASTOLIC BLOOD PRESSURE 80-89 MM HG: ICD-10-PCS | Mod: ,,, | Performed by: FAMILY MEDICINE

## 2023-12-22 PROCEDURE — 86431 RHEUMATOID FACTOR QUANT: CPT | Mod: ,,, | Performed by: CLINICAL MEDICAL LABORATORY

## 2023-12-22 PROCEDURE — 80050 GENERAL HEALTH PANEL: CPT | Mod: ,,, | Performed by: CLINICAL MEDICAL LABORATORY

## 2023-12-22 PROCEDURE — 3008F PR BODY MASS INDEX (BMI) DOCUMENTED: ICD-10-PCS | Mod: ,,, | Performed by: FAMILY MEDICINE

## 2023-12-22 PROCEDURE — 3008F BODY MASS INDEX DOCD: CPT | Mod: ,,, | Performed by: FAMILY MEDICINE

## 2023-12-22 PROCEDURE — 99396 PR PREVENTIVE VISIT,EST,40-64: ICD-10-PCS | Mod: ,,, | Performed by: FAMILY MEDICINE

## 2023-12-22 PROCEDURE — 80050 PR  GENERAL HEALTH PANEL: ICD-10-PCS | Mod: ,,, | Performed by: CLINICAL MEDICAL LABORATORY

## 2023-12-22 PROCEDURE — 86038 ANA EIA W/REFLEX DSDNA/ENA: ICD-10-PCS | Mod: ,,, | Performed by: CLINICAL MEDICAL LABORATORY

## 2023-12-22 PROCEDURE — 1159F MED LIST DOCD IN RCRD: CPT | Mod: ,,, | Performed by: FAMILY MEDICINE

## 2023-12-22 PROCEDURE — 80061 LIPID PANEL: ICD-10-PCS | Mod: ,,, | Performed by: CLINICAL MEDICAL LABORATORY

## 2023-12-22 PROCEDURE — 86038 ANTINUCLEAR ANTIBODIES: CPT | Mod: ,,, | Performed by: CLINICAL MEDICAL LABORATORY

## 2023-12-22 PROCEDURE — 86140 C-REACTIVE PROTEIN: CPT | Mod: ,,, | Performed by: CLINICAL MEDICAL LABORATORY

## 2023-12-22 PROCEDURE — 84550 ASSAY OF BLOOD/URIC ACID: CPT | Mod: ,,, | Performed by: CLINICAL MEDICAL LABORATORY

## 2023-12-22 PROCEDURE — 1159F PR MEDICATION LIST DOCUMENTED IN MEDICAL RECORD: ICD-10-PCS | Mod: ,,, | Performed by: FAMILY MEDICINE

## 2023-12-22 PROCEDURE — 85651 SEDIMENTATION RATE, AUTOMATED: ICD-10-PCS | Mod: ,,, | Performed by: CLINICAL MEDICAL LABORATORY

## 2023-12-22 PROCEDURE — 85651 RBC SED RATE NONAUTOMATED: CPT | Mod: ,,, | Performed by: CLINICAL MEDICAL LABORATORY

## 2023-12-22 PROCEDURE — 99396 PREV VISIT EST AGE 40-64: CPT | Mod: ,,, | Performed by: FAMILY MEDICINE

## 2023-12-22 RX ORDER — TOPIRAMATE 25 MG/1
25 TABLET ORAL 2 TIMES DAILY
Qty: 60 TABLET | Refills: 11 | Status: SHIPPED | OUTPATIENT
Start: 2023-12-22 | End: 2024-12-21

## 2023-12-22 RX ORDER — ONDANSETRON HYDROCHLORIDE 8 MG/1
8 TABLET, FILM COATED ORAL 2 TIMES DAILY
Qty: 20 TABLET | Refills: 5 | Status: SHIPPED | OUTPATIENT
Start: 2023-12-22

## 2023-12-22 RX ORDER — ATORVASTATIN CALCIUM 20 MG/1
20 TABLET, FILM COATED ORAL DAILY
Qty: 90 TABLET | Refills: 3 | Status: SHIPPED | OUTPATIENT
Start: 2023-12-22

## 2023-12-22 RX ORDER — UBROGEPANT 50 MG/1
50 TABLET ORAL
Qty: 20 TABLET | Refills: 2 | Status: SHIPPED | OUTPATIENT
Start: 2023-12-22

## 2023-12-22 RX ORDER — FOLIC ACID 1 MG/1
1 TABLET ORAL DAILY
Qty: 100 TABLET | Refills: 1 | Status: SHIPPED | OUTPATIENT
Start: 2023-12-22

## 2023-12-22 RX ORDER — ZOLPIDEM TARTRATE 5 MG/1
5 TABLET ORAL NIGHTLY PRN
Qty: 30 TABLET | Refills: 2 | Status: SHIPPED | OUTPATIENT
Start: 2023-12-22 | End: 2024-06-21

## 2023-12-22 RX ORDER — ERGOCALCIFEROL 1.25 MG/1
CAPSULE ORAL
Qty: 12 CAPSULE | Refills: 1 | Status: SHIPPED | OUTPATIENT
Start: 2023-12-22

## 2023-12-22 NOTE — Clinical Note
Hepatitis C Screening pt declined Cervical Cancer Screening pt will schedule  TETANUS VACCINE declined Influenza Vaccine(1) [facility] 09/30/23 COVID-19 Vaccine(3 - 2023-24 season) declined Mammogram pt scheduled 01/31/24

## 2023-12-26 PROBLEM — G43.009 MIGRAINE WITHOUT AURA AND WITHOUT STATUS MIGRAINOSUS, NOT INTRACTABLE: Status: ACTIVE | Noted: 2023-12-26

## 2023-12-26 LAB
ANA SER QL: POSITIVE
DSDNA IGG SERPL IA-ACNC: 13 IU (ref 0–24)
DSDNA IGG SERPL IA-ACNC: NEGATIVE [IU]/ML

## 2023-12-26 NOTE — ASSESSMENT & PLAN NOTE
History of possible folate deficiency being on methotrexate.  Will continue her current dose.  Recheck B12 and folate levels on her every 6 months

## 2023-12-26 NOTE — ASSESSMENT & PLAN NOTE
History of insomnia.  Will give her Ambien 5 mg at HS.  Discussed sleep hygiene with the patient.  Follow-up every 3 months

## 2023-12-26 NOTE — ASSESSMENT & PLAN NOTE
History of hyperlipidemia with her currently not on any medications.  Added atorvastatin 20 mg once daily.  Recheck lipid panel in 3 months.  Lab Results   Component Value Date    LDLCALC 128 12/22/2023

## 2023-12-26 NOTE — ASSESSMENT & PLAN NOTE
History of lupus currently on methotrexate.  Patient has not taken that in a while and is having increasing symptoms.  Will check her EDWINA rheumatoid factor sed rate CRP and uric acid level.  Rheumatology referral

## 2023-12-27 NOTE — PROGRESS NOTES
Farhan Steve DO   [address]  [place]  [address]      PATIENT NAME: Anamika Banda  : 1975  DATE: 23  MRN: [id number]      Billing Provider: Farhan Steve DO  Level of Service:   Patient PCP Information       Provider PCP Type    Farhan Steve DO General            Reason for Visit / Chief Complaint: wellness (Healthy you ) and Health Maintenance (Hepatitis C Screening pt declined/Cervical Cancer Screening pt will schedule /TETANUS VACCINE declined/Influenza Vaccine(1) [facility]/COVID-19 Vaccine(3 - 2023-24 season) declined/Mammogram pt scheduled )       Update PCP  Update Chief Complaint         History of Present Illness / Problem Focused Workflow     Anamika Banda presents to the clinic with wellness (Healthy you ) and Health Maintenance (Hepatitis C Screening pt declined/Cervical Cancer Screening pt will schedule /TETANUS VACCINE declined/Influenza Vaccine(1) [facility]/COVID-19 Vaccine(3 - 2023-24 season) declined/Mammogram pt scheduled )     Patient is in for healthy you.  She does have a history of Toradol but has not been on anything.  She does take vitamin-D as well as folate.  She had previously been on methotrexate for lupus but has been off of it for a few weeks.  She also has a history of migraines and previously been on topiramate.  She has sleep issues and has trouble maintaining her sleep.        Review of Systems     Review of Systems   Constitutional:  Positive for fatigue. Negative for activity change, appetite change, chills and fever.   HENT:  Negative for nasal congestion, ear discharge, ear pain, mouth dryness, mouth sores, postnasal drip, sinus pressure/congestion, sore throat and voice change.    Eyes:  Negative for pain, discharge, redness, itching and visual disturbance.   Respiratory:  Negative for apnea, cough, chest tightness, shortness of breath and wheezing.    Cardiovascular:  Negative for chest pain,  palpitations and leg swelling.   Gastrointestinal:  Negative for abdominal distention, abdominal pain, anal bleeding, blood in stool, change in bowel habit, constipation, diarrhea, nausea, vomiting and reflux.   Endocrine: Negative for cold intolerance, heat intolerance, polydipsia, polyphagia and polyuria.   Genitourinary:  Negative for difficulty urinating, enuresis, frequency, genital sores, hematuria, hot flashes, menstrual irregularity, urgency and vaginal dryness.   Musculoskeletal:  Positive for arthralgias, joint swelling and joint deformity. Negative for back pain, gait problem, leg pain, myalgias and neck pain.   Integumentary:  Negative for rash, mole/lesion, breast mass, breast discharge and breast tenderness.   Allergic/Immunologic: Negative for environmental allergies and food allergies.   Neurological:  Negative for dizziness, vertigo, tremors, seizures, syncope, facial asymmetry, speech difficulty, weakness, light-headedness, numbness, headaches, memory loss and coordination difficulties.   Hematological:  Negative for adenopathy. Does not bruise/bleed easily.   Psychiatric/Behavioral:  Positive for sleep disturbance. Negative for agitation, behavioral problems, confusion, decreased concentration, dysphoric mood, hallucinations, self-injury and suicidal ideas. The patient is not nervous/anxious and is not hyperactive.    Breast: Negative for mass and tenderness      Medical / Social / Family History     Past Medical History:   Diagnosis Date    Arthritis     Insomnia     Lupus        Past Surgical History:   Procedure Laterality Date    AUGMENTATION OF BREAST Bilateral      SECTION         Social History  Ms.  reports that she has never smoked. She has never been exposed to tobacco smoke. She has never used smokeless tobacco. She reports that she does not currently use alcohol after a past usage of about 1.0 standard drink of alcohol per week. She reports that she does not use  drugs.    Family History  Ms.'s family history includes Cancer in her father; Hypertension in her mother.    Medications and Allergies     Medications  Outpatient Medications Marked as Taking for the 12/22/23 encounter (Office Visit) with Farhan Steve, DO   Medication Sig Dispense Refill    [DISCONTINUED] atorvastatin (LIPITOR) 20 MG tablet Take 1 tablet (20 mg total) by mouth once daily. 90 tablet 3    [DISCONTINUED] ergocalciferol (ERGOCALCIFEROL) 50,000 unit Cap TAKE ONE CAPSULE ONCE WEEKLY. 12 capsule 1    [DISCONTINUED] folic acid (FOLVITE) 1 MG tablet Take 1 tablet (1 mg total) by mouth once daily. 100 tablet 1    [DISCONTINUED] zolpidem (AMBIEN) 5 MG Tab Take 1 tablet (5 mg total) by mouth nightly as needed (sleep). 30 tablet 2       Allergies  Review of patient's allergies indicates:  No Known Allergies    Physical Examination     Vitals:    12/22/23 0837   BP: 123/86   Pulse: 67   Resp: 19   Temp: 98 °F (36.7 °C)     Physical Exam  Vitals reviewed.   Constitutional:       Appearance: Normal appearance. She is normal weight.   HENT:      Head: Normocephalic and atraumatic.      Nose: Nose normal.      Mouth/Throat:      Mouth: Mucous membranes are moist.      Pharynx: Oropharynx is clear. No oropharyngeal exudate or posterior oropharyngeal erythema.   Eyes:      General: No scleral icterus.     Extraocular Movements: Extraocular movements intact.      Conjunctiva/sclera: Conjunctivae normal.      Pupils: Pupils are equal, round, and reactive to light.   Neck:      Vascular: No carotid bruit.   Cardiovascular:      Rate and Rhythm: Normal rate and regular rhythm.      Pulses: Normal pulses.      Heart sounds: Normal heart sounds. No murmur heard.     No gallop.   Pulmonary:      Effort: Pulmonary effort is normal.      Breath sounds: Normal breath sounds. No wheezing.   Abdominal:      General: Abdomen is flat. Bowel sounds are normal.      Palpations: Abdomen is soft.      Tenderness: There is no  abdominal tenderness.   Musculoskeletal:         General: Normal range of motion.      Cervical back: Normal range of motion and neck supple.      Right lower leg: No edema.      Left lower leg: No edema.   Lymphadenopathy:      Cervical: No cervical adenopathy.   Skin:     General: Skin is warm and dry.      Capillary Refill: Capillary refill takes less than 2 seconds.      Coloration: Skin is not jaundiced.      Findings: No lesion or rash.   Neurological:      General: No focal deficit present.      Mental Status: She is alert and oriented to person, place, and time. Mental status is at baseline.      Cranial Nerves: No cranial nerve deficit.      Sensory: No sensory deficit.      Motor: No weakness.      Coordination: Coordination normal.      Gait: Gait normal.      Deep Tendon Reflexes: Reflexes normal.   Psychiatric:         Mood and Affect: Mood normal.         Behavior: Behavior normal.         Thought Content: Thought content normal.         Judgment: Judgment normal.               Lab Results   Component Value Date    WBC 5.24 12/22/2023    HGB 13.8 12/22/2023    HCT 39.5 12/22/2023    MCV 92.7 12/22/2023     12/22/2023          Sodium   Date Value Ref Range Status   12/22/2023 139 136 - 145 mmol/L Final     Potassium   Date Value Ref Range Status   12/22/2023 3.8 3.5 - 5.1 mmol/L Final     Chloride   Date Value Ref Range Status   12/22/2023 109 (H) 98 - 107 mmol/L Final     CO2   Date Value Ref Range Status   12/22/2023 25 21 - 32 mmol/L Final     Glucose   Date Value Ref Range Status   12/22/2023 100 74 - 106 mg/dL Final     BUN   Date Value Ref Range Status   12/22/2023 13 7 - 18 mg/dL Final     Creatinine   Date Value Ref Range Status   12/22/2023 0.79 0.55 - 1.02 mg/dL Final     Calcium   Date Value Ref Range Status   12/22/2023 8.6 8.5 - 10.1 mg/dL Final     Total Protein   Date Value Ref Range Status   12/22/2023 7.1 6.4 - 8.2 g/dL Final     Albumin   Date Value Ref Range Status   12/22/2023  3.8 3.5 - 5.0 g/dL Final     Bilirubin, Total   Date Value Ref Range Status   12/22/2023 0.9 >0.0 - 1.2 mg/dL Final     Alk Phos   Date Value Ref Range Status   12/22/2023 86 39 - 100 U/L Final     AST   Date Value Ref Range Status   12/22/2023 29 15 - 37 U/L Final     ALT   Date Value Ref Range Status   12/22/2023 48 13 - 56 U/L Final     Anion Gap   Date Value Ref Range Status   12/22/2023 9 7 - 16 mmol/L Final     eGFR   Date Value Ref Range Status   07/08/2022 76 >=60 mL/min/1.73m² Final      Mammo Digital Screening Bilat  Narrative: Result:   Mammo Digital Screening Bilat     History:  Patient is 47 y.o. and is seen for a screening mammogram.    Films Compared:  No prior studies available for comparison.     Findings:  The breasts have scattered areas of fibroglandular density. There is no   evidence of suspicious masses, calcifications, or other abnormal findings.     Subpectoral saline breast implants are intact.  Impression: Bilateral  There is no mammographic evidence of malignancy.    BI-RADS Category:   Overall: 1 - Negative       Recommendation:  Routine screening mammogram in 1 year is recommended.     Procedures   Lab Results   Component Value Date    CHOL 216 (H) 12/22/2023    CHOL 205 (H) 07/20/2023    CHOL 134 03/24/2023     Lab Results   Component Value Date    HDL 42 12/22/2023    HDL 45 07/20/2023    HDL 45 03/24/2023     Lab Results   Component Value Date    LDLCALC 128 12/22/2023    LDLCALC 131 07/20/2023    LDLCALC 63 03/24/2023     Lab Results   Component Value Date    TRIG 228 (H) 12/22/2023    TRIG 145 07/20/2023    TRIG 129 03/24/2023     Lab Results   Component Value Date    CHOLHDL 5.1 12/22/2023    CHOLHDL 4.6 07/20/2023    CHOLHDL 3.0 03/24/2023      Lab Results   Component Value Date    HGBA1C 4.8 07/20/2023      Lab Results   Component Value Date    TSH 0.718 12/22/2023    B4AOAKT 10.7 07/08/2022      Assessment and Plan (including Health Maintenance)      Problem List  Smart Sets   Document Outside HM   :    Plan:         Health Maintenance Due   Topic Date Due    Hepatitis C Screening  Never done    Cervical Cancer Screening  Never done    TETANUS VACCINE  Never done    Influenza Vaccine (1) Never done    COVID-19 Vaccine (3 - 2023-24 season) 09/01/2023    Mammogram  01/25/2024       Problem List Items Addressed This Visit          Neuro    Migraine without aura and without status migrainosus, not intractable    Current Assessment & Plan     Patient currently has migraines about twice monthly.  She is currently on topiramate.  Will continue that at 25 mg twice a day daily.  Ubrelvy as needed for the headache.  Follow-up in 1 month or p.r.n.         Relevant Medications    topiramate (TOPAMAX) 25 MG tablet    ubrogepant (UBRELVY) 50 mg tablet    ondansetron (ZOFRAN) 8 MG tablet       Cardiac/Vascular    Hyperlipidemia    Current Assessment & Plan     History of hyperlipidemia with her currently not on any medications.  Added atorvastatin 20 mg once daily.  Recheck lipid panel in 3 months.  Lab Results   Component Value Date    LDLCALC 128 12/22/2023             Relevant Medications    atorvastatin (LIPITOR) 20 MG tablet    Other Relevant Orders    Lipid Panel (Completed)       Immunology/Multi System    Lupus    Current Assessment & Plan     History of lupus currently on methotrexate.  Patient has not taken that in a while and is having increasing symptoms.  Will check her EDWINA rheumatoid factor sed rate CRP and uric acid level.  Rheumatology referral         Relevant Medications    folic acid (FOLVITE) 1 MG tablet    Other Relevant Orders    CBC Auto Differential (Completed)    C-Reactive Protein (Completed)    Sedimentation Rate (Completed)    Uric Acid (Completed)    Comprehensive Metabolic Panel (Completed)    TSH (Completed)       Oncology    Malignant melanoma of torso excluding breast - Primary    Current Assessment & Plan     Currently being followed by Dermatology.  No recurrence of  her melanoma.            Endocrine    Vitamin B 12 deficiency    Current Assessment & Plan     History of possible folate deficiency being on methotrexate.  Will continue her current dose.  Recheck B12 and folate levels on her every 6 months         Vitamin D deficiency    Current Assessment & Plan     Vitamin-D deficiency currently taking 25745 units weekly.  Continue current dose.  Recheck a level in 6 months.         Relevant Medications    ergocalciferol (ERGOCALCIFEROL) 50,000 unit Cap       Other    Insomnia    Current Assessment & Plan     History of insomnia.  Will give her Ambien 5 mg at HS.  Discussed sleep hygiene with the patient.  Follow-up every 3 months         Relevant Medications    zolpidem (AMBIEN) 5 MG Tab     Other Visit Diagnoses       Disorder of breast implant, sequela        MARIBEL syndrome. Also had Assure procedure for fallopian tubes. Foreign body.     Relevant Medications    folic acid (FOLVITE) 1 MG tablet    Other Relevant Orders    EDWINA EIA w/ Reflex to dsDNA/HARMEET (Completed)    Rheumatoid Quantitative (Completed)    Antiextractable Nuclear Ag    Anti-DNA Ab, Double-Stranded (Completed)            Health Maintenance Topics with due status: Not Due       Topic Last Completion Date    Colorectal Cancer Screening 10/05/2022    Hemoglobin A1c (Diabetic Prevention Screening) 07/20/2023    Lipid Panel 12/22/2023       Future Appointments   Date Time Provider Department Center   1/31/2024  9:20 AM [facility]   12/27/2024  8:40 AM Farhan Steve DO [facility]        Follow up in about 4 weeks (around 1/19/2024), or if symptoms worsen or fail to improve.     Signature:  Farhan Steve DO  [address]  [address]    Date of encounter: 12/22/23

## 2023-12-27 NOTE — ASSESSMENT & PLAN NOTE
Patient currently has migraines about twice monthly.  She is currently on topiramate.  Will continue that at 25 mg twice a day daily.  Ubrelvy as needed for the headache.  Follow-up in 1 month or p.r.n.

## 2023-12-28 LAB
ENA AB SER QL IA: 3.4 EUS (ref 0–19.9)
ENA AB SER QL IA: NEGATIVE

## 2024-01-03 ENCOUNTER — TELEPHONE (OUTPATIENT)
Dept: FAMILY MEDICINE | Facility: CLINIC | Age: 49
End: 2024-01-03
Payer: COMMERCIAL

## 2024-01-03 NOTE — TELEPHONE ENCOUNTER
----- Message from Rula Acosta sent at 1/3/2024 10:04 AM CST -----  Ubrelvy was called in for patient and she stated that they needed a PA to fill it. She stated that [facility] is still waiting on the PA. She said she was seen about two weeks ago and was wondering if something else could be called in because she has a migraine today.   [phone]

## 2024-01-04 DIAGNOSIS — G43.009 MIGRAINE WITHOUT AURA AND WITHOUT STATUS MIGRAINOSUS, NOT INTRACTABLE: Primary | ICD-10-CM

## 2024-01-04 RX ORDER — SUMATRIPTAN 50 MG/1
50 TABLET, FILM COATED ORAL ONCE
Qty: 30 TABLET | Refills: 0 | Status: SHIPPED | OUTPATIENT
Start: 2024-01-04 | End: 2024-01-11 | Stop reason: SDUPTHER

## 2024-01-11 DIAGNOSIS — G43.009 MIGRAINE WITHOUT AURA AND WITHOUT STATUS MIGRAINOSUS, NOT INTRACTABLE: ICD-10-CM

## 2024-01-11 RX ORDER — SUMATRIPTAN 50 MG/1
50 TABLET, FILM COATED ORAL DAILY PRN
Qty: 30 TABLET | Refills: 0 | Status: SHIPPED | OUTPATIENT
Start: 2024-01-11

## 2024-01-23 ENCOUNTER — OFFICE VISIT (OUTPATIENT)
Dept: FAMILY MEDICINE | Facility: CLINIC | Age: 49
End: 2024-01-23
Payer: COMMERCIAL

## 2024-01-23 VITALS
HEART RATE: 133 BPM | RESPIRATION RATE: 18 BRPM | OXYGEN SATURATION: 97 % | HEIGHT: 63 IN | TEMPERATURE: 103 F | SYSTOLIC BLOOD PRESSURE: 113 MMHG | DIASTOLIC BLOOD PRESSURE: 75 MMHG | BODY MASS INDEX: 26.11 KG/M2 | WEIGHT: 147.38 LBS

## 2024-01-23 DIAGNOSIS — U07.1 COVID-19: Primary | ICD-10-CM

## 2024-01-23 DIAGNOSIS — R50.9 FEVER, UNSPECIFIED FEVER CAUSE: ICD-10-CM

## 2024-01-23 DIAGNOSIS — R05.9 COUGH, UNSPECIFIED TYPE: ICD-10-CM

## 2024-01-23 LAB
CTP QC/QA: YES
FLUAV AG NPH QL: NEGATIVE
FLUBV AG NPH QL: NEGATIVE
S PYO RRNA THROAT QL PROBE: NEGATIVE
SARS-COV-2 AG RESP QL IA.RAPID: POSITIVE

## 2024-01-23 PROCEDURE — 1159F MED LIST DOCD IN RCRD: CPT | Mod: ,,,

## 2024-01-23 PROCEDURE — 87804 INFLUENZA ASSAY W/OPTIC: CPT | Mod: QW,,,

## 2024-01-23 PROCEDURE — 3008F BODY MASS INDEX DOCD: CPT | Mod: ,,,

## 2024-01-23 PROCEDURE — 3074F SYST BP LT 130 MM HG: CPT | Mod: ,,,

## 2024-01-23 PROCEDURE — 87880 STREP A ASSAY W/OPTIC: CPT | Mod: QW,,,

## 2024-01-23 PROCEDURE — 99214 OFFICE O/P EST MOD 30 MIN: CPT | Mod: ,,,

## 2024-01-23 PROCEDURE — 1160F RVW MEDS BY RX/DR IN RCRD: CPT | Mod: ,,,

## 2024-01-23 PROCEDURE — 3078F DIAST BP <80 MM HG: CPT | Mod: ,,,

## 2024-01-23 PROCEDURE — 87426 SARSCOV CORONAVIRUS AG IA: CPT | Mod: QW,,,

## 2024-01-23 RX ORDER — CHLOPHEDIANOL HCL AND PYRILAMINE MALEATE 12.5; 12.5 MG/5ML; MG/5ML
10 SOLUTION ORAL 2 TIMES DAILY PRN
Qty: 473 ML | Refills: 0 | Status: SHIPPED | OUTPATIENT
Start: 2024-01-23

## 2024-01-23 RX ORDER — BENZONATATE 100 MG/1
100 CAPSULE ORAL 3 TIMES DAILY PRN
Qty: 30 CAPSULE | Refills: 0 | Status: SHIPPED | OUTPATIENT
Start: 2024-01-23 | End: 2024-02-02

## 2024-01-23 NOTE — ASSESSMENT & PLAN NOTE
Patient tested positive for Covid 19 with only mild symptoms at this point.    -Advised to take Vitamin D 5000 units daily, Zinc 50 mg daily, Vitamin C 1000mg daily, Zyrtec 10 mg daily, Pepcid 20mg daily, Aspirin 81mg daily - all for 14 days.  -May treat cough with OTC anti-tussive meds, body aches or fever with Tylenol or Motrin OTC.    -Instructed based on the most recent CDC guidelines to quarantine for 5 days from date of positive and  then wear a well fitting mask for 5 additional days after leaving quarantine.      If you are still experiencing symptoms or fever 5 days after testing positive, then continue to quarantine for 5 additional days.     - Instructed that all household contacts or those you've been in very close contact with should also quarantine for 5 days followed by 5 days of wearing a well fitting mask.  If household contacts develop symptoms they should seek testing or start their quarantine over from the day that symptoms develop.      -Quarantine means stay home, wear a mask when you can not avoid being around someone else in your home, try to maintain at least 6 feet from others in your home, and ideally confine yourself to only one room and one bathroom away from other household member.    Use frequent hand washing and clean touched surfaces frequently.    Remain active while at home in quarantine - get up and walk around outside or in your home.    -RTC for re-evaluation for worsening symptoms or go to ED.  -Go to local er if shortness of breath or chest pain occur.

## 2024-01-23 NOTE — LETTER
January 23, 2024      [facility] [address] [place] [address]  Phone: [phone]  Fax: [phone]       Patient: Anamika Banda   YOB: 1975  Date of Visit: 01/23/2024    To Whom It May Concern:    Amaury Banda  was at [facility] on 01/23/2024. The patient may return to work/school on 01/29/2024 with no restrictions. If you have any questions or concerns, or if I can be of further assistance, please do not hesitate to contact me.    Sincerely,  PIOTR Vega LPN

## 2024-01-23 NOTE — PATIENT INSTRUCTIONS
-Increase fluid intake.   -Off work for 5 days. Give excuse.   -Ninja cough as needed for cough  -Tessalon pearls as needed for cough  -Rotate tylenol and Ibprofen for fever       Patient tested positive for Covid 19 with only mild symptoms at this point.    -Advised to take Vitamin D 5000 units daily, Zinc 50 mg daily, Vitamin C 1000mg daily, Zyrtec 10 mg daily, Pepcid 20mg daily, Aspirin 81mg daily - all for 14 days.  -May treat cough with OTC anti-tussive meds, body aches or fever with Tylenol or Motrin OTC.    -Instructed based on the most recent CDC guidelines to quarantine for 5 days from date of positive and  then wear a well fitting mask for 5 additional days after leaving quarantine.      If you are still experiencing symptoms or fever 5 days after testing positive, then continue to quarantine for 5 additional days.     - Instructed that all household contacts or those you've been in very close contact with should also quarantine for 5 days followed by 5 days of wearing a well fitting mask.  If household contacts develop symptoms they should seek testing or start their quarantine over from the day that symptoms develop.      -Quarantine means stay home, wear a mask when you can not avoid being around someone else in your home, try to maintain at least 6 feet from others in your home, and ideally confine yourself to only one room and one bathroom away from other household member.    Use frequent hand washing and clean touched surfaces frequently.    Remain active while at home in quarantine - get up and walk around outside or in your home.    -RTC for re-evaluation for worsening symptoms or go to ED.

## 2024-01-23 NOTE — PROGRESS NOTES
HPI:   Anamika Banda is a pleasant 48 y.o. patient who reports to clinic with complaints of headache, vomiting, short of breath with exertion, weak and dry cough since last night. 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  She states at midnight her temp was 100.4 and she was coughing so she took Nyquil and tylenol and went back to bed. She states this morning it was no better when she woke up. She works in a nursing home. She denies any chest pain.     Cough  This is a new problem. The current episode started yesterday. The problem has been gradually worsening. The problem occurs constantly. The cough is Non-productive. Associated symptoms include chills, a fever, headaches and shortness of breath. Pertinent negatives include no chest pain or postnasal drip. Associated symptoms comments: Chest burns when coughing . Nothing aggravates the symptoms. Treatments tried: nyquil. The treatment provided mild relief.   Emesis   This is a new problem. Episode onset: last night. The problem occurs less than 2 times per day. The problem has been unchanged. The maximum temperature recorded prior to her arrival was 102 - 102.9 F. Associated symptoms include chills, coughing, a fever and headaches. Pertinent negatives include no chest pain, diarrhea or dizziness. Treatments tried: zofran. The treatment provided moderate relief.                Past Medical History:   Diagnosis Date    Arthritis     Insomnia     Lupus        PAST SURGICAL HISTORY:   Past Surgical History:   Procedure Laterality Date    AUGMENTATION OF BREAST Bilateral      SECTION         MEDICATIONS:    Current Outpatient Medications:     atorvastatin (LIPITOR) 20 MG tablet, Take 1 tablet (20 mg total) by mouth once daily., Disp: 90 tablet, Rfl: 3    ergocalciferol (ERGOCALCIFEROL) 50,000  unit Cap, TAKE ONE CAPSULE ONCE WEEKLY., Disp: 12 capsule, Rfl: 1    folic acid (FOLVITE) 1 MG tablet, Take 1 tablet (1 mg total) by mouth once daily., Disp: 100 tablet, Rfl: 1    sumatriptan (IMITREX) 50 MG tablet, Take 1 tablet (50 mg total) by mouth daily as needed for Migraine. MAY TAKE ADDITIONAL DOSE IN 2 HOURS IF NEEDED NOT TO EXCEED 200MG/24 HRS., Disp: 30 tablet, Rfl: 0    topiramate (TOPAMAX) 25 MG tablet, Take 1 tablet (25 mg total) by mouth 2 (two) times daily., Disp: 60 tablet, Rfl: 11    zolpidem (AMBIEN) 5 MG Tab, Take 1 tablet (5 mg total) by mouth nightly as needed (sleep)., Disp: 30 tablet, Rfl: 2    benzonatate (TESSALON) 100 MG capsule, Take 1 capsule (100 mg total) by mouth 3 (three) times daily as needed for Cough., Disp: 30 capsule, Rfl: 0    methotrexate 2.5 MG Tab, Take 6 tablets (15 mg total) by mouth once a week. (Patient not taking: Reported on 12/22/2023), Disp: 72 tablet, Rfl: 0    ondansetron (ZOFRAN) 8 MG tablet, Take 1 tablet (8 mg total) by mouth 2 (two) times daily., Disp: 20 tablet, Rfl: 5    pyrilamine-chlophedianoL (NINJACOF) 12.5-12.5 mg/5 mL Liqd, Take 10 mLs by mouth 2 (two) times daily as needed (cough)., Disp: 473 mL, Rfl: 0    ubrogepant (UBRELVY) 50 mg tablet, Take 1 tablet (50 mg total) by mouth as needed for Migraine. If symptoms persist or return, may repeat dose after 2 hours. Maximum: 200 mg per 24 hours (Patient not taking: Reported on 1/23/2024), Disp: 20 tablet, Rfl: 2    ALLERGIES:   Review of patient's allergies indicates:  No Known Allergies      Review of Systems   Constitutional:  Positive for chills, fatigue and fever. Negative for activity change.   HENT: Negative.  Negative for nasal congestion, drooling, nosebleeds, postnasal drip and sinus pressure/congestion.    Eyes: Negative.    Respiratory:  Positive for cough and shortness of breath. Negative for chest tightness.    Cardiovascular: Negative.  Negative for chest pain and palpitations.    Gastrointestinal:  Positive for vomiting. Negative for diarrhea.   Endocrine: Negative.    Genitourinary: Negative.  Negative for difficulty urinating.   Musculoskeletal: Negative.    Integumentary:  Negative.   Allergic/Immunologic: Negative.    Neurological:  Positive for headaches. Negative for dizziness.   Hematological: Negative.    Psychiatric/Behavioral: Negative.     All other systems reviewed and are negative.         Physical Exam  Vitals reviewed.   Constitutional:       General: She is not in acute distress.     Appearance: Normal appearance. She is well-developed and normal weight. She is not ill-appearing.   HENT:      Head: Normocephalic and atraumatic.      Right Ear: Tympanic membrane normal.      Left Ear: Tympanic membrane normal.      Nose: Nose normal.      Mouth/Throat:      Mouth: Mucous membranes are moist.      Pharynx: Oropharynx is clear. No posterior oropharyngeal erythema.   Cardiovascular:      Rate and Rhythm: Normal rate and regular rhythm.      Pulses: Normal pulses.      Heart sounds: Normal heart sounds.   Pulmonary:      Effort: Pulmonary effort is normal. No accessory muscle usage or respiratory distress.      Breath sounds: Normal breath sounds.   Abdominal:      General: Abdomen is flat. Bowel sounds are normal. There is no distension.      Palpations: Abdomen is soft.      Tenderness: There is no abdominal tenderness.   Musculoskeletal:         General: Normal range of motion.      Cervical back: Normal range of motion.   Skin:     General: Skin is warm and dry.      Capillary Refill: Capillary refill takes less than 2 seconds.   Neurological:      Mental Status: She is alert and oriented to person, place, and time. Mental status is at baseline.   Psychiatric:         Mood and Affect: Mood normal.         Speech: Speech normal.         Behavior: Behavior normal. Behavior is cooperative.         Thought Content: Thought content normal.          VITAL SIGNS:   /75 (BP  "Location: Right arm, Patient Position: Sitting, BP Method: Medium (Automatic))   Pulse (!) 133   Temp (!) 102.6 °F (39.2 °C) (Oral)   Resp 18   Ht 5' 3" (1.6 m)   Wt 66.9 kg (147 lb 6.4 oz)   SpO2 97%   BMI 26.11 kg/m²       ASSESSMENT/PLAN  1. COVID-19  Assessment & Plan:  Patient tested positive for Covid 19 with only mild symptoms at this point.    -Advised to take Vitamin D 5000 units daily, Zinc 50 mg daily, Vitamin C 1000mg daily, Zyrtec 10 mg daily, Pepcid 20mg daily, Aspirin 81mg daily - all for 14 days.  -May treat cough with OTC anti-tussive meds, body aches or fever with Tylenol or Motrin OTC.    -Instructed based on the most recent CDC guidelines to quarantine for 5 days from date of positive and  then wear a well fitting mask for 5 additional days after leaving quarantine.      If you are still experiencing symptoms or fever 5 days after testing positive, then continue to quarantine for 5 additional days.     - Instructed that all household contacts or those you've been in very close contact with should also quarantine for 5 days followed by 5 days of wearing a well fitting mask.  If household contacts develop symptoms they should seek testing or start their quarantine over from the day that symptoms develop.      -Quarantine means stay home, wear a mask when you can not avoid being around someone else in your home, try to maintain at least 6 feet from others in your home, and ideally confine yourself to only one room and one bathroom away from other household member.    Use frequent hand washing and clean touched surfaces frequently.    Remain active while at home in quarantine - get up and walk around outside or in your home.    -RTC for re-evaluation for worsening symptoms or go to ED.  -Go to local er if shortness of breath or chest pain occur.     Orders:  -     pyrilamine-chlophedianoL (NINJACOF) 12.5-12.5 mg/5 mL Liqd; Take 10 mLs by mouth 2 (two) times daily as needed (cough).  Dispense: " 473 mL; Refill: 0  -     benzonatate (TESSALON) 100 MG capsule; Take 1 capsule (100 mg total) by mouth 3 (three) times daily as needed for Cough.  Dispense: 30 capsule; Refill: 0    2. Fever, unspecified fever cause  Assessment & Plan:  Her fever was 102.6  Administered 1000 mg of tylenol.   Heart rate is elevated more than likely due to the fever.   She states she is going to check her pulse after her fever breaks and makes sure it has went down.       Orders:  -     POCT Influenza A/B Rapid Antigen  -     SARS Coronavirus 2 Antigen, POCT  -     POCT rapid strep A    3. Cough, unspecified type  Assessment & Plan:  COVID Positive  Increase fluid intake  Start Ninja Cough over the counter as needed for cough  Follow up with the clinic if s/s persist or become worse.   Tessalon pearls as needed     Orders:  -     pyrilamine-chlophedianoL (NINJACOF) 12.5-12.5 mg/5 mL Liqd; Take 10 mLs by mouth 2 (two) times daily as needed (cough).  Dispense: 473 mL; Refill: 0  -     benzonatate (TESSALON) 100 MG capsule; Take 1 capsule (100 mg total) by mouth 3 (three) times daily as needed for Cough.  Dispense: 30 capsule; Refill: 0             Patient Instructions   -Increase fluid intake.   -Off work for 5 days. Give excuse.   -Ninja cough as needed for cough  -Tessalon pearls as needed for cough  -Rotate tylenol and Ibprofen for fever       Patient tested positive for Covid 19 with only mild symptoms at this point.    -Advised to take Vitamin D 5000 units daily, Zinc 50 mg daily, Vitamin C 1000mg daily, Zyrtec 10 mg daily, Pepcid 20mg daily, Aspirin 81mg daily - all for 14 days.  -May treat cough with OTC anti-tussive meds, body aches or fever with Tylenol or Motrin OTC.    -Instructed based on the most recent CDC guidelines to quarantine for 5 days from date of positive and  then wear a well fitting mask for 5 additional days after leaving quarantine.      If you are still experiencing symptoms or fever 5 days after testing  positive, then continue to quarantine for 5 additional days.     - Instructed that all household contacts or those you've been in very close contact with should also quarantine for 5 days followed by 5 days of wearing a well fitting mask.  If household contacts develop symptoms they should seek testing or start their quarantine over from the day that symptoms develop.      -Quarantine means stay home, wear a mask when you can not avoid being around someone else in your home, try to maintain at least 6 feet from others in your home, and ideally confine yourself to only one room and one bathroom away from other household member.    Use frequent hand washing and clean touched surfaces frequently.    Remain active while at home in quarantine - get up and walk around outside or in your home.    -RTC for re-evaluation for worsening symptoms or go to ED.    Orders Placed This Encounter   Procedures    POCT Influenza A/B Rapid Antigen    SARS Coronavirus 2 Antigen, POCT    POCT rapid strep A

## 2024-01-23 NOTE — ASSESSMENT & PLAN NOTE
Her fever was 102.6  Administered 1000 mg of tylenol.   Heart rate is elevated more than likely due to the fever.   She states she is going to check her pulse after her fever breaks and makes sure it has went down.

## 2024-01-23 NOTE — ASSESSMENT & PLAN NOTE
COVID Positive  Increase fluid intake  Start Ninja Cough over the counter as needed for cough  Follow up with the clinic if s/s persist or become worse.   Tessalon pearls as needed

## 2024-01-24 ENCOUNTER — PATIENT OUTREACH (OUTPATIENT)
Dept: ADMINISTRATIVE | Facility: HOSPITAL | Age: 49
End: 2024-01-24

## 2024-01-31 ENCOUNTER — HOSPITAL ENCOUNTER (OUTPATIENT)
Dept: RADIOLOGY | Facility: HOSPITAL | Age: 49
Discharge: HOME | End: 2024-01-31
Attending: FAMILY MEDICINE
Payer: COMMERCIAL

## 2024-01-31 VITALS — WEIGHT: 150 LBS | HEIGHT: 63 IN | BODY MASS INDEX: 26.58 KG/M2

## 2024-01-31 DIAGNOSIS — Z12.31 OTHER SCREENING MAMMOGRAM: ICD-10-CM

## 2024-01-31 PROCEDURE — 77067 SCR MAMMO BI INCL CAD: CPT | Mod: TC

## 2024-02-05 ENCOUNTER — OFFICE VISIT (OUTPATIENT)
Dept: FAMILY MEDICINE | Facility: CLINIC | Age: 49
End: 2024-02-05
Payer: COMMERCIAL

## 2024-02-05 VITALS
HEART RATE: 84 BPM | RESPIRATION RATE: 19 BRPM | TEMPERATURE: 98 F | OXYGEN SATURATION: 97 % | DIASTOLIC BLOOD PRESSURE: 78 MMHG | SYSTOLIC BLOOD PRESSURE: 109 MMHG | BODY MASS INDEX: 26.47 KG/M2 | WEIGHT: 149.38 LBS

## 2024-02-05 DIAGNOSIS — L23.9 ALLERGIC CONTACT DERMATITIS, UNSPECIFIED TRIGGER: Primary | ICD-10-CM

## 2024-02-05 PROCEDURE — 99213 OFFICE O/P EST LOW 20 MIN: CPT | Mod: 25,,, | Performed by: NURSE PRACTITIONER

## 2024-02-05 PROCEDURE — 3008F BODY MASS INDEX DOCD: CPT | Mod: ,,, | Performed by: NURSE PRACTITIONER

## 2024-02-05 PROCEDURE — 1160F RVW MEDS BY RX/DR IN RCRD: CPT | Mod: ,,, | Performed by: NURSE PRACTITIONER

## 2024-02-05 PROCEDURE — 96372 THER/PROPH/DIAG INJ SC/IM: CPT | Mod: ,,, | Performed by: NURSE PRACTITIONER

## 2024-02-05 PROCEDURE — 1159F MED LIST DOCD IN RCRD: CPT | Mod: ,,, | Performed by: NURSE PRACTITIONER

## 2024-02-05 PROCEDURE — 3078F DIAST BP <80 MM HG: CPT | Mod: ,,, | Performed by: NURSE PRACTITIONER

## 2024-02-05 PROCEDURE — 3074F SYST BP LT 130 MM HG: CPT | Mod: ,,, | Performed by: NURSE PRACTITIONER

## 2024-02-05 RX ORDER — METHYLPREDNISOLONE 4 MG/1
TABLET ORAL
Qty: 21 EACH | Refills: 0 | Status: SHIPPED | OUTPATIENT
Start: 2024-02-05 | End: 2024-02-26

## 2024-02-05 RX ORDER — DEXAMETHASONE SODIUM PHOSPHATE 4 MG/ML
4 INJECTION, SOLUTION INTRA-ARTICULAR; INTRALESIONAL; INTRAMUSCULAR; INTRAVENOUS; SOFT TISSUE
Status: COMPLETED | OUTPATIENT
Start: 2024-02-05 | End: 2024-02-05

## 2024-02-05 RX ADMIN — DEXAMETHASONE SODIUM PHOSPHATE 4 MG: 4 INJECTION, SOLUTION INTRA-ARTICULAR; INTRALESIONAL; INTRAMUSCULAR; INTRAVENOUS; SOFT TISSUE at 02:02

## 2024-02-05 NOTE — PROGRESS NOTES
"   PIOTR Jordan   [facility]  [address]  [place] [address]     PATIENT NAME: Anamika Banda  : 1975  DATE: 24  MRN: [id number]      Billing Provider: PIORT Jordan  Level of Service:   Patient PCP Information       Provider PCP Type    Farhan Steve DO General            Reason for Visit / Chief Complaint: Rash (Pt noticed the rash this morning when she got out of the shower. It is on her stomach and back. She also noticed a few minutes ago that it has started on her leg. 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 Pt also had covid recently. She took zyrtec )   Health Maintenance Due   Topic Date Due    Hepatitis C Screening  Never done    Cervical Cancer Screening  Never done    TETANUS VACCINE  Never done    Influenza Vaccine (1) Never done    COVID-19 Vaccine (3 - 2023-24 season) 2023          Update PCP  Update Chief Complaint         History of Present Illness / Problem Focused Workflow     Anamika Banda presents to the clinic due to generalized, itchy rash to anterior/posterior trunks, BLE, BUE. She states rash has spread since when she first noticed. 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 However, she has not been exposed to any allergens such as new detergent, lotions, soaps, medications, poison etc. She denies severe itching but states she has taken Zyrtec that has helped. She has not taken Benadryl due to it "knocks" her out. She had covid recently so unsure if the rash was caused by this. She denies any other needs at this time. NAD noted.     Hemoglobin A1C   Date Value Ref Range Status   2023 4.8 4.5 - 6.6 % Final     Comment:       Normal:               <5.7%  Pre-Diabetic:       5.7% to 6.4%  Diabetic:             >6.4%  Diabetic Goal:     <7%   2023 5.4 4.5 - 6.6 % Final     Comment:       Normal:               <5.7%  Pre-Diabetic:       5.7% " to 6.4%  Diabetic:             >6.4%  Diabetic Goal:     <7%        CMP  Sodium   Date Value Ref Range Status   12/22/2023 139 136 - 145 mmol/L Final     Potassium   Date Value Ref Range Status   12/22/2023 3.8 3.5 - 5.1 mmol/L Final     Chloride   Date Value Ref Range Status   12/22/2023 109 (H) 98 - 107 mmol/L Final     CO2   Date Value Ref Range Status   12/22/2023 25 21 - 32 mmol/L Final     Glucose   Date Value Ref Range Status   12/22/2023 100 74 - 106 mg/dL Final     BUN   Date Value Ref Range Status   12/22/2023 13 7 - 18 mg/dL Final     Creatinine   Date Value Ref Range Status   12/22/2023 0.79 0.55 - 1.02 mg/dL Final     Calcium   Date Value Ref Range Status   12/22/2023 8.6 8.5 - 10.1 mg/dL Final     Total Protein   Date Value Ref Range Status   12/22/2023 7.1 6.4 - 8.2 g/dL Final     Albumin   Date Value Ref Range Status   12/22/2023 3.8 3.5 - 5.0 g/dL Final     Bilirubin, Total   Date Value Ref Range Status   12/22/2023 0.9 >0.0 - 1.2 mg/dL Final     Alk Phos   Date Value Ref Range Status   12/22/2023 86 39 - 100 U/L Final     AST   Date Value Ref Range Status   12/22/2023 29 15 - 37 U/L Final     ALT   Date Value Ref Range Status   12/22/2023 48 13 - 56 U/L Final     Anion Gap   Date Value Ref Range Status   12/22/2023 9 7 - 16 mmol/L Final     eGFR   Date Value Ref Range Status   12/22/2023 94 >=60 mL/min/1.73m2 Final        Lab Results   Component Value Date    WBC 5.24 12/22/2023    RBC 4.26 12/22/2023    HGB 13.8 12/22/2023    HCT 39.5 12/22/2023    MCV 92.7 12/22/2023    MCH 32.4 (H) 12/22/2023    MCHC 34.9 12/22/2023    RDW 13.1 12/22/2023     12/22/2023    MPV 10.6 12/22/2023    LYMPH 23.7 (L) 12/22/2023    LYMPH 1.24 12/22/2023    MONO 5.5 12/22/2023    EOS 0.11 12/22/2023    BASO 0.03 12/22/2023    EOSINOPHIL 2.1 12/22/2023    BASOPHIL 0.6 12/22/2023        Lab Results   Component Value Date    CHOL 216 (H) 12/22/2023    CHOL 205 (H) 07/20/2023    CHOL 134 03/24/2023     Lab Results    Component Value Date    HDL 42 2023    HDL 45 2023    HDL 45 2023     Lab Results   Component Value Date    LDLCALC 128 2023    LDLCALC 131 2023    LDLCALC 63 2023     Lab Results   Component Value Date    TRIG 228 (H) 2023    TRIG 145 2023    TRIG 129 2023     Lab Results   Component Value Date    CHOLHDL 5.1 2023    CHOLHDL 4.6 2023    CHOLHDL 3.0 2023        Wt Readings from Last 3 Encounters:   24 1310 67.8 kg (149 lb 6.4 oz)   24 0934 68 kg (150 lb)   24 0902 66.9 kg (147 lb 6.4 oz)        BP Readings from Last 3 Encounters:   24 109/78   24 113/75   23 123/86        Review of Systems     Review of Systems   Constitutional: Negative.    Respiratory: Negative.     Cardiovascular: Negative.    Gastrointestinal: Negative.    Endocrine: Negative.    Genitourinary: Negative.    Musculoskeletal: Negative.    Integumentary:  Positive for rash.   Allergic/Immunologic: Negative.    Neurological: Negative.    Hematological: Negative.    Psychiatric/Behavioral: Negative.          Medical / Social / Family History     Past Medical History:   Diagnosis Date    Arthritis     Insomnia     Lupus        Past Surgical History:   Procedure Laterality Date    AUGMENTATION OF BREAST Bilateral      SECTION         Social History  Ms.  reports that she has never smoked. She has never been exposed to tobacco smoke. She has never used smokeless tobacco. She reports that she does not currently use alcohol after a past usage of about 1.0 standard drink of alcohol per week. She reports that she does not use drugs.    Family History  Ms.'s family history includes Breast cancer in her mother; Cancer in her father; Hypertension in her mother.    Medications and Allergies     Medications  No outpatient medications have been marked as taking for the 24 encounter (Office Visit) with Melody Jo FNP.     Current  Facility-Administered Medications for the 2/5/24 encounter (Office Visit) with Melody Jo FNP   Medication Dose Route Frequency Provider Last Rate Last Admin    [COMPLETED] dexAMETHasone injection 4 mg  4 mg Intramuscular 1 time in Clinic/HOD Jean-Claude JoPIOTR miranda   4 mg at 02/05/24 1400       Allergies  Review of patient's allergies indicates:  No Known Allergies    Physical Examination     Vitals:    02/05/24 1310   BP: 109/78   BP Location: Left arm   Patient Position: Sitting   BP Method: Large (Automatic)   Pulse: 84   Resp: 19   Temp: 97.8 °F (36.6 °C)   TempSrc: Oral   SpO2: 97%   Weight: 67.8 kg (149 lb 6.4 oz)      Physical Exam  Constitutional:       Appearance: Normal appearance.   HENT:      Head: Normocephalic.   Eyes:      Extraocular Movements: Extraocular movements intact.   Cardiovascular:      Rate and Rhythm: Normal rate and regular rhythm.      Pulses: Normal pulses.      Heart sounds: Normal heart sounds.   Pulmonary:      Effort: Pulmonary effort is normal.      Breath sounds: Normal breath sounds.   Skin:     General: Skin is warm and dry.      Capillary Refill: Capillary refill takes less than 2 seconds.      Findings: Rash present.      Comments: Generalized, pruritic, erythematous, fine rash to anterior/posterior trunks, BLE, BUE   Neurological:      General: No focal deficit present.      Mental Status: She is alert and oriented to person, place, and time.   Psychiatric:         Mood and Affect: Mood normal.         Behavior: Behavior normal.          Assessment and Plan (including Health Maintenance)      Problem List  Smart Sets  Document Outside HM   :    Plan:     There are no Patient Instructions on file for this visit.       Health Maintenance Due   Topic Date Due    Hepatitis C Screening  Never done    Cervical Cancer Screening  Never done    TETANUS VACCINE  Never done    Influenza Vaccine (1) Never done    COVID-19 Vaccine (3 - 2023-24 season) 09/01/2023       Problem List Items  Addressed This Visit       Allergic contact dermatitis - Primary    Current Assessment & Plan     Decadron 4mg injection given. No adverse reaction noted.   Medrol Dose Henrique prescribed to take as directed.   Instructed to RTC for any new/worsening/persisting ssx.           Relevant Medications    dexAMETHasone injection 4 mg (Completed)    methylPREDNISolone (MEDROL DOSEPACK) 4 mg tablet     Allergic contact dermatitis, unspecified trigger  -     dexAMETHasone injection 4 mg  -     methylPREDNISolone (MEDROL DOSEPACK) 4 mg tablet; use as directed  Dispense: 21 each; Refill: 0       Health Maintenance Topics with due status: Not Due       Topic Last Completion Date    Colorectal Cancer Screening 10/05/2022    Hemoglobin A1c (Diabetic Prevention Screening) 07/20/2023    Lipid Panel 12/22/2023    Mammogram 01/31/2024         Future Appointments   Date Time Provider Department Center   12/27/2024  8:40 AM Farhan Steve, DO [facility] [place] [place]   2/5/2025  9:40 AM [facility]        No follow-ups on file.    Health Maintenance Due   Topic Date Due    Hepatitis C Screening  Never done    Cervical Cancer Screening  Never done    TETANUS VACCINE  Never done    Influenza Vaccine (1) Never done    COVID-19 Vaccine (3 - 2023-24 season) 09/01/2023        Signature:  PIOTR Jordan    Date of encounter: 2/5/24

## 2024-02-05 NOTE — ASSESSMENT & PLAN NOTE
Decadron 4mg injection given. No adverse reaction noted.   Medrol Dose Henrique prescribed to take as directed.   Instructed to RTC for any new/worsening/persisting ssx.

## 2024-03-08 ENCOUNTER — PATIENT OUTREACH (OUTPATIENT)
Dept: ADMINISTRATIVE | Facility: HOSPITAL | Age: 49
End: 2024-03-08

## 2024-03-08 NOTE — PROGRESS NOTES
Health maintenance record review for population health care gaps    Population Health Chart Review & Patient Outreach Details      Further Action Needed If Patient Returns Outreach:      Mammogram uploaded to care gaps       Updates Requested / Reviewed:     []  Care Everywhere    []     []  External Sources ([facility], [facility], DIS, etc.)    [] [facility]   [] [facility]   [] Other:    [x]  Care Team Updated   []  Removed  or Duplicate Orders   []  Immunization Reconciliation Completed / Queried    [] [place]   [] [place]   [] [place]   [] [place]      Health Maintenance Topics Addressed and Outreach Outcomes / Actions Taken:             Breast Cancer Screening []  Mammogram Order Placed    []  Mammogram Screening Scheduled    []  External Records Requested & Care Team Updated if Applicable    [x]  External Records Uploaded & Care Team Updated if Applicable    []  Pt Declined Scheduling Mammogram    []  Pt Will Schedule with External Provider / Order Routed & Care Team Updated if Applicable              Cervical Cancer Screening []  Pap Smear Scheduled in Primary Care or OBGYN    []  External Records Requested & Care Team Updated if Applicable       []  External Records Uploaded, Care Team Updated, & History Updated if Applicable    []  Patient Declined Scheduling Pap Smear    []  Patient Will Schedule with External Provider & Care Team Updated if Applicable                  Colorectal Cancer Screening []  Colonoscopy Case Request / Referral / Home Test Order Placed    []  External Records Requested & Care Team Updated if Applicable    []  External Records Uploaded, Care Team Updated, & History Updated if Applicable    []  Patient Declined Completing Colon Cancer Screening    []  Patient Will Schedule with External Provider & Care Team Updated if Applicable    []  Fit Kit Mailed (add the SmartPhrase under additional notes)    []  Reminded Patient to Complete Home Test                Diabetic Eye Exam []   Eye Exam Screening Order Placed    []  Eye Camera Scheduled or Optometry/Ophthalmology Referral Placed    []  External Records Requested & Care Team Updated if Applicable    []  External Records Uploaded, Care Team Updated, & History Updated if Applicable    []  Patient Declined Scheduling Eye Exam    []  Patient Will Schedule with External Provider & Care Team Updated if Applicable             Blood Pressure Control []  Primary Care Follow Up Visit Scheduled     []  Remote Blood Pressure Reading Captured    []  Patient Declined Remote Reading or Scheduling Appt - Escalated to PCP    []  Patient Will Call Back or Send Portal Message with Reading                 HbA1c & Other Labs []  Overdue Lab(s) Ordered    []  Overdue Lab(s) Scheduled    []  External Records Uploaded & Care Team Updated if Applicable    []  Primary Care Follow Up Visit Scheduled     []  Reminded Patient to Complete A1c Home Test    []  Patient Declined Scheduling Labs or Will Call Back to Schedule    []  Patient Will Schedule with External Provider / Order Routed, & Care Team Updated if Applicable           Primary Care Appointment []  Primary Care Appt Scheduled    []  Patient Declined Scheduling or Will Call Back to Schedule    []  Pt Established with External Provider, Updated Care Team, & Informed Pt to Notify Payor if Applicable           Medication Adherence /    Statin Use []  Primary Care Appointment Scheduled    []  Patient Reminded to  Prescription    []  Patient Declined, Provider Notified if Needed    []  Sent Provider Message to Review to Evaluate Pt for Statin, Add Exclusion Dx Codes, Document   Exclusion in Problem List, Change Statin Intensity Level to Moderate or High Intensity if Applicable                Osteoporosis Screening []  Dexa Order Placed    []  Dexa Appointment Scheduled    []  External Records Requested & Care Team Updated    []  External Records Uploaded, Care Team Updated, & History Updated if Applicable     []  Patient Declined Scheduling Dexa or Will Call Back to Schedule    []  Patient Will Schedule with External Provider / Order Routed & Care Team Updated if Applicable       Additional Notes:          no visual changes

## 2024-10-24 ENCOUNTER — PATIENT MESSAGE (OUTPATIENT)
Dept: GASTROENTEROLOGY | Facility: CLINIC | Age: 49
End: 2024-10-24

## 2024-12-27 ENCOUNTER — OFFICE VISIT (OUTPATIENT)
Dept: FAMILY MEDICINE | Facility: CLINIC | Age: 49
End: 2024-12-27
Payer: COMMERCIAL

## 2024-12-27 VITALS
SYSTOLIC BLOOD PRESSURE: 115 MMHG | BODY MASS INDEX: 24.27 KG/M2 | WEIGHT: 137 LBS | RESPIRATION RATE: 18 BRPM | HEART RATE: 76 BPM | HEIGHT: 63 IN | DIASTOLIC BLOOD PRESSURE: 81 MMHG | TEMPERATURE: 98 F | OXYGEN SATURATION: 98 %

## 2024-12-27 DIAGNOSIS — F51.01 PRIMARY INSOMNIA: ICD-10-CM

## 2024-12-27 DIAGNOSIS — R42 VERTIGO: ICD-10-CM

## 2024-12-27 DIAGNOSIS — Z13.220 SCREENING FOR LIPOID DISORDERS: ICD-10-CM

## 2024-12-27 DIAGNOSIS — Z00.00 ROUTINE GENERAL MEDICAL EXAMINATION AT A HEALTH CARE FACILITY: Primary | ICD-10-CM

## 2024-12-27 DIAGNOSIS — H65.02 NON-RECURRENT ACUTE SEROUS OTITIS MEDIA OF LEFT EAR: ICD-10-CM

## 2024-12-27 DIAGNOSIS — Z13.1 SCREENING FOR DIABETES MELLITUS: ICD-10-CM

## 2024-12-27 DIAGNOSIS — E78.5 HYPERLIPIDEMIA, UNSPECIFIED HYPERLIPIDEMIA TYPE: ICD-10-CM

## 2024-12-27 DIAGNOSIS — F32.0 CURRENT MILD EPISODE OF MAJOR DEPRESSIVE DISORDER WITHOUT PRIOR EPISODE: ICD-10-CM

## 2024-12-27 DIAGNOSIS — F41.9 ANXIETY: ICD-10-CM

## 2024-12-27 DIAGNOSIS — G43.009 MIGRAINE WITHOUT AURA AND WITHOUT STATUS MIGRAINOSUS, NOT INTRACTABLE: ICD-10-CM

## 2024-12-27 DIAGNOSIS — M32.8 OTHER FORMS OF SYSTEMIC LUPUS ERYTHEMATOSUS, UNSPECIFIED ORGAN INVOLVEMENT STATUS: ICD-10-CM

## 2024-12-27 PROCEDURE — 3079F DIAST BP 80-89 MM HG: CPT | Mod: ,,, | Performed by: FAMILY MEDICINE

## 2024-12-27 PROCEDURE — 1160F RVW MEDS BY RX/DR IN RCRD: CPT | Mod: ,,, | Performed by: FAMILY MEDICINE

## 2024-12-27 PROCEDURE — 3074F SYST BP LT 130 MM HG: CPT | Mod: ,,, | Performed by: FAMILY MEDICINE

## 2024-12-27 PROCEDURE — 1159F MED LIST DOCD IN RCRD: CPT | Mod: ,,, | Performed by: FAMILY MEDICINE

## 2024-12-27 PROCEDURE — 99396 PREV VISIT EST AGE 40-64: CPT | Mod: ,,, | Performed by: FAMILY MEDICINE

## 2024-12-27 PROCEDURE — 3008F BODY MASS INDEX DOCD: CPT | Mod: ,,, | Performed by: FAMILY MEDICINE

## 2024-12-27 RX ORDER — ZOLPIDEM TARTRATE 5 MG/1
5 TABLET ORAL NIGHTLY PRN
Qty: 30 TABLET | Refills: 2 | Status: SHIPPED | OUTPATIENT
Start: 2024-12-27 | End: 2025-06-27

## 2024-12-27 RX ORDER — MECLIZINE HYDROCHLORIDE 25 MG/1
25 TABLET ORAL 3 TIMES DAILY PRN
Qty: 60 TABLET | Refills: 2 | Status: SHIPPED | OUTPATIENT
Start: 2024-12-27

## 2024-12-27 RX ORDER — SUMATRIPTAN SUCCINATE 50 MG/1
50 TABLET ORAL DAILY PRN
Qty: 30 TABLET | Refills: 5 | Status: SHIPPED | OUTPATIENT
Start: 2024-12-27

## 2024-12-27 RX ORDER — AZITHROMYCIN 250 MG/1
TABLET, FILM COATED ORAL
Qty: 6 TABLET | Refills: 0 | Status: SHIPPED | OUTPATIENT
Start: 2024-12-27 | End: 2025-01-01

## 2024-12-27 RX ORDER — ATORVASTATIN CALCIUM 20 MG/1
20 TABLET, FILM COATED ORAL DAILY
Qty: 90 TABLET | Refills: 3 | Status: SHIPPED | OUTPATIENT
Start: 2024-12-27

## 2024-12-27 RX ORDER — METHYLPREDNISOLONE 4 MG/1
TABLET ORAL
Qty: 21 EACH | Refills: 0 | Status: SHIPPED | OUTPATIENT
Start: 2024-12-27 | End: 2025-01-17

## 2024-12-27 RX ORDER — CITALOPRAM 10 MG/1
10 TABLET ORAL DAILY
Qty: 30 TABLET | Refills: 11 | Status: SHIPPED | OUTPATIENT
Start: 2024-12-27 | End: 2025-12-27

## 2024-12-27 RX ORDER — ONDANSETRON HYDROCHLORIDE 8 MG/1
8 TABLET, FILM COATED ORAL 2 TIMES DAILY
Qty: 20 TABLET | Refills: 5 | Status: SHIPPED | OUTPATIENT
Start: 2024-12-27

## 2024-12-27 NOTE — PROGRESS NOTES
Farhan Steve DO   [facility]  [address] [address] [place]  [address]      PATIENT NAME: Anamika Banda  : 1975  DATE: 24  MRN: [id number]      Billing Provider: Farhan Steve DO  Level of Service:   Patient PCP Information       Provider PCP Type    Farhan Steve DO General            Reason for Visit / Chief Complaint: Healthy You (Mrs. Banda is a 50 y/o female presenting today for a healthy you. Complains of having dizziness for about a week now. ), Dizziness (Having dizziness for about a week now. States she was having sinus congestion and that's when it started. Comes and goes. Primarily happens with she changes positions. ), and Health Maintenance (Discussed care gaps with pt, will get her pap scheduled on her one. Does not wish to update anything else at this time. )       Update PCP  Update Chief Complaint         History of Present Illness / Problem Focused Workflow     Anamika Banda presents to the clinic with Healthy You (Mrs. Banda is a 50 y/o female presenting today for a healthy you. Complains of having dizziness for about a week now. ), Dizziness (Having dizziness for about a week now. States she was having sinus congestion and that's when it started. Comes and goes. Primarily happens with she changes positions. ), and Health Maintenance (Discussed care gaps with pt, will get her pap scheduled on her one. Does not wish to update anything else at this time. )     Patient with visit for healthy you however she does have some dizziness this been going on for about a week and some fullness in her here.  She has no fever chills or sweats.  No cough or congestion.  She has a history of migraines which have been under fair control.  She also has had insomnia but is tolerable with Ambien.        Review of Systems     Review of Systems   Constitutional:  Positive for activity change. Negative for appetite change, chills, fatigue and fever.   HENT:  Negative for nasal  congestion, ear discharge, ear pain, mouth dryness, mouth sores, postnasal drip, sinus pressure/congestion, sore throat and voice change.    Eyes:  Negative for pain, discharge, redness, itching and visual disturbance.   Respiratory:  Positive for shortness of breath. Negative for apnea, cough, chest tightness and wheezing.    Cardiovascular:  Negative for chest pain, palpitations and leg swelling.   Gastrointestinal:  Negative for abdominal distention, abdominal pain, anal bleeding, blood in stool, change in bowel habit, constipation, diarrhea, nausea, vomiting and reflux.   Endocrine: Negative for cold intolerance, heat intolerance, polydipsia, polyphagia and polyuria.   Genitourinary:  Negative for difficulty urinating, enuresis, frequency, genital sores, hematuria, hot flashes, menstrual irregularity, urgency and vaginal dryness.   Musculoskeletal:  Positive for arthralgias. Negative for back pain, gait problem, leg pain, myalgias and neck pain.   Integumentary:  Negative for rash, mole/lesion, breast mass, breast discharge and breast tenderness.   Allergic/Immunologic: Negative for environmental allergies and food allergies.   Neurological:  Positive for dizziness and headaches. Negative for vertigo, tremors, seizures, syncope, facial asymmetry, speech difficulty, weakness, light-headedness, numbness, memory loss and coordination difficulties.   Hematological:  Negative for adenopathy. Does not bruise/bleed easily.   Psychiatric/Behavioral:  Negative for agitation, behavioral problems, confusion, decreased concentration, dysphoric mood, hallucinations, self-injury, sleep disturbance and suicidal ideas. The patient is not nervous/anxious and is not hyperactive.    Breast: Negative for mass and tenderness      Medical / Social / Family History     Past Medical History:   Diagnosis Date    Arthritis     Insomnia     Lupus        Past Surgical History:   Procedure Laterality Date    AUGMENTATION OF BREAST Bilateral       SECTION         Social History  Ms.  reports that she has never smoked. She has never been exposed to tobacco smoke. She has never used smokeless tobacco. She reports that she does not currently use alcohol after a past usage of about 1.0 standard drink of alcohol per week. She reports that she does not use drugs.    Family History  Ms.'s family history includes Breast cancer in her mother; Cancer in her father; Hypertension in her mother.    Medications and Allergies     Medications  Outpatient Medications Marked as Taking for the 24 encounter (Office Visit) with Farhan Steve, DO   Medication Sig Dispense Refill    ergocalciferol (ERGOCALCIFEROL) 50,000 unit Cap TAKE ONE CAPSULE ONCE WEEKLY. 12 capsule 1    [DISCONTINUED] atorvastatin (LIPITOR) 20 MG tablet Take 1 tablet (20 mg total) by mouth once daily. 90 tablet 3    [DISCONTINUED] ondansetron (ZOFRAN) 8 MG tablet Take 1 tablet (8 mg total) by mouth 2 (two) times daily. 20 tablet 5    [DISCONTINUED] sumatriptan (IMITREX) 50 MG tablet Take 1 tablet (50 mg total) by mouth daily as needed for Migraine. MAY TAKE ADDITIONAL DOSE IN 2 HOURS IF NEEDED NOT TO EXCEED 200MG/24 HRS. 30 tablet 0    [DISCONTINUED] zolpidem (AMBIEN) 5 MG Tab Take 1 tablet (5 mg total) by mouth nightly as needed (sleep). 30 tablet 2       Allergies  Review of patient's allergies indicates:  No Known Allergies    Physical Examination     Vitals:    24 0856   BP: 115/81   Pulse: 76   Resp: 18   Temp: 98.4 °F (36.9 °C)     Physical Exam  Vitals reviewed.   Constitutional:       General: She is not in acute distress.     Appearance: Normal appearance. She is normal weight.   HENT:      Head: Normocephalic and atraumatic.      Nose: Congestion and rhinorrhea present.      Comments: Maxillary sinus tenderness     Mouth/Throat:      Mouth: Mucous membranes are moist.      Pharynx: Oropharynx is clear. No oropharyngeal exudate or posterior oropharyngeal erythema.   Eyes:       General: No scleral icterus.     Extraocular Movements: Extraocular movements intact.      Conjunctiva/sclera: Conjunctivae normal.      Pupils: Pupils are equal, round, and reactive to light.   Neck:      Vascular: No carotid bruit.   Cardiovascular:      Rate and Rhythm: Normal rate and regular rhythm.      Pulses: Normal pulses.      Heart sounds: Normal heart sounds. No murmur heard.     No gallop.   Pulmonary:      Effort: Pulmonary effort is normal.      Breath sounds: Normal breath sounds. No wheezing.   Abdominal:      General: Abdomen is flat. Bowel sounds are normal.      Palpations: Abdomen is soft.      Tenderness: There is no abdominal tenderness.   Musculoskeletal:         General: Normal range of motion.      Cervical back: Normal range of motion and neck supple.      Right lower leg: No edema.      Left lower leg: No edema.   Lymphadenopathy:      Cervical: No cervical adenopathy.   Skin:     General: Skin is warm and dry.      Capillary Refill: Capillary refill takes less than 2 seconds.      Coloration: Skin is not jaundiced.      Findings: No lesion.   Neurological:      General: No focal deficit present.      Mental Status: She is alert and oriented to person, place, and time. Mental status is at baseline.      Cranial Nerves: No cranial nerve deficit.      Sensory: No sensory deficit.      Motor: No weakness.      Coordination: Coordination normal.      Gait: Gait normal.      Deep Tendon Reflexes: Reflexes normal.   Psychiatric:         Mood and Affect: Mood normal.         Behavior: Behavior normal.         Thought Content: Thought content normal.         Judgment: Judgment normal.               Lab Results   Component Value Date    WBC 5.24 12/22/2023    HGB 13.8 12/22/2023    HCT 39.5 12/22/2023    MCV 92.7 12/22/2023     12/22/2023          Sodium   Date Value Ref Range Status   12/22/2023 139 136 - 145 mmol/L Final     Potassium   Date Value Ref Range Status   12/22/2023 3.8  3.5 - 5.1 mmol/L Final     Chloride   Date Value Ref Range Status   12/22/2023 109 (H) 98 - 107 mmol/L Final     CO2   Date Value Ref Range Status   12/22/2023 25 21 - 32 mmol/L Final     Glucose   Date Value Ref Range Status   01/08/2025 90 74 - 100 mg/dL Final     BUN   Date Value Ref Range Status   12/22/2023 13 7 - 18 mg/dL Final     Creatinine   Date Value Ref Range Status   12/22/2023 0.79 0.55 - 1.02 mg/dL Final     Calcium   Date Value Ref Range Status   12/22/2023 8.6 8.5 - 10.1 mg/dL Final     Total Protein   Date Value Ref Range Status   12/22/2023 7.1 6.4 - 8.2 g/dL Final     Albumin   Date Value Ref Range Status   12/22/2023 3.8 3.5 - 5.0 g/dL Final     Bilirubin, Total   Date Value Ref Range Status   12/22/2023 0.9 >0.0 - 1.2 mg/dL Final     Alk Phos   Date Value Ref Range Status   12/22/2023 86 39 - 100 U/L Final     AST   Date Value Ref Range Status   12/22/2023 29 15 - 37 U/L Final     ALT   Date Value Ref Range Status   12/22/2023 48 13 - 56 U/L Final     Anion Gap   Date Value Ref Range Status   12/22/2023 9 7 - 16 mmol/L Final     eGFR   Date Value Ref Range Status   07/08/2022 76 >=60 mL/min/1.73m² Final      Mammo Digital Screening Bilat w/ Antelmo  Narrative: Facility:  [address] [address] [phone]    Name: Anamika Banda    MRN: [id number]    Result:  Mammo Digital Screening Bilat w/ Antelmo    History:  Patient is 50 y.o. and is seen for a screening mammogram.    Films Compared:  Compared to: 01/31/2024 Mammo Digital Screening Bilat w/ Antelmo and   01/25/2023 Mammo Digital Screening Bilat     Findings:  This procedure was performed using tomosynthesis.   Computer-aided detection was utilized in the interpretation of this   examination.    There are scattered areas of fibroglandular density. There is no evidence   of suspicious masses, microcalcifications or architectural distortion.   Bilateral breast implants.  Impression:    No mammographic evidence  of malignancy.    BI-RADS Category 1: Negative    Recommendation:  Routine screening mammogram in 1 year is recommended.    Your estimated lifetime risk of breast cancer (to age 85) based on   Tyrer-Cuzick risk assessment model is 8.09%.  According to the American   Cancer Society, patients with a lifetime breast cancer risk of 20% or   higher might benefit from supplemental screening tests, such as screening   breast MRI.    Cornel Khan MD     Procedures   Lab Results   Component Value Date    CHOL 183 01/08/2025    CHOL 216 (H) 12/22/2023    CHOL 205 (H) 07/20/2023     Lab Results   Component Value Date    HDL 48 01/08/2025    HDL 42 12/22/2023    HDL 45 07/20/2023     Lab Results   Component Value Date    LDLCALC 107 01/08/2025    LDLCALC 128 12/22/2023    LDLCALC 131 07/20/2023     Lab Results   Component Value Date    TRIG 140 01/08/2025    TRIG 228 (H) 12/22/2023    TRIG 145 07/20/2023     Lab Results   Component Value Date    CHOLHDL 3.8 01/08/2025    CHOLHDL 5.1 12/22/2023    CHOLHDL 4.6 07/20/2023      Lab Results   Component Value Date    HGBA1C 4.8 07/20/2023      Lab Results   Component Value Date    TSH 0.718 12/22/2023    A4CTHID 10.7 07/08/2022      Assessment and Plan (including Health Maintenance)      Problem List  Smart Sets  Document Outside HM   :    Plan:         Health Maintenance Due   Topic Date Due    Hepatitis C Screening  Never done    Cervical Cancer Screening  Never done    TETANUS VACCINE  Never done    COVID-19 Vaccine (3 - Pfizer risk series) 12/02/2021       Problem List Items Addressed This Visit       Other forms of systemic lupus erythematosus    Current Assessment & Plan   Patient has a history of lupus but is no longer on methotrexate is and has been told that she probably does not have lupus.  Her EDWINA is been positive for while.  She has been treated for years with medication.  Will discontinue the methotrexate but will monitor her labs.         Insomnia    Current  Assessment & Plan   Insomnia that will treat with Ambien 5 mg at bedtime.  If that is not effective will go to 10 mg.  Follow-up in 3 months.  Sleep hygiene discussed.         Relevant Medications    zolpidem (AMBIEN) 5 MG Tab    Hyperlipidemia    Current Assessment & Plan   Lab Results   Component Value Date    LDLCALC 107 01/08/2025    Patient on atorvastatin.  Goal is LDL C of 70 or less.  Continue atorvastatin but would recommend that she increase it to 40 mg.         Relevant Medications    atorvastatin (LIPITOR) 20 MG tablet    Migraine without aura and without status migrainosus, not intractable    Current Assessment & Plan   Patient is here for follow-up on her migraines.  They are becoming a little more frequent.  Will refill her Imitrex.  Continue Celexa which helps with the migraines.  Follow-up every 3 months.         Relevant Medications    sumatriptan (IMITREX) 50 MG tablet    ondansetron (ZOFRAN) 8 MG tablet    Non-recurrent acute serous otitis media of left ear    Current Assessment & Plan   Patient reports dizziness and has serous otitis and likely vestibular neuronitis.  Will give the patient a Medrol Dosepak and Zithromax Dosepak.  She is to follow-up p.r.n..  Antivert as needed for the vertigo          Other Visit Diagnoses         Routine general medical examination at a health care facility    -  Primary      Current mild episode of major depressive disorder without prior episode        Relevant Medications    citalopram (CELEXA) 10 MG tablet      Anxiety          Vertigo        Relevant Medications    meclizine (ANTIVERT) 25 mg tablet      Screening for lipoid disorders        Relevant Orders    Lipid Panel (Completed)      Screening for diabetes mellitus        Relevant Orders    Glucose, Random (Completed)            Health Maintenance Topics with due status: Not Due       Topic Last Completion Date    Colorectal Cancer Screening 10/05/2022    Lipid Panel 01/08/2025    Mammogram 02/05/2025     RSV Vaccine (Age 60+ and Pregnant patients) Not Due       Future Appointments   Date Time Provider Department Center   11/21/2025  8:40 AM Farhan Steve DO [facility]   2/11/2026  9:20 AM [facility]        Follow up in about 3 months (around 3/27/2025).     Signature:  Farhan Steve DO  [address]  [address]    Date of encounter: 12/27/24

## 2024-12-30 ENCOUNTER — PATIENT OUTREACH (OUTPATIENT)
Facility: HOSPITAL | Age: 49
End: 2024-12-30
Payer: COMMERCIAL

## 2024-12-30 NOTE — PROGRESS NOTES
Population Health Chart Review & Patient Outreach Details      Further Action Needed If Patient Returns Outreach:      Erika Monzon RN  Licha Restrepo LPN  Ordered pt notified      Updates Requested / Reviewed:     []  Care Everywhere    []     []  External Sources ([facility], [facility], [facility], etc.)    [] [facility]   [] [facility]   [] Other:    []  Care Team Updated   []  Removed  or Duplicate Orders   []  Immunization Reconciliation Completed / Queried    [] [place]   [] [place]   [] [place]   [] [place]      Health Maintenance Topics Addressed and Outreach Outcomes / Actions Taken:             Breast Cancer Screening []  Mammogram Order Placed    []  Mammogram Screening Scheduled    []  External Records Requested & Care Team Updated if Applicable    []  External Records Uploaded & Care Team Updated if Applicable    []  Pt Declined Scheduling Mammogram    []  Pt Will Schedule with External Provider / Order Routed & Care Team Updated if Applicable              Cervical Cancer Screening []  Pap Smear Scheduled in Primary Care or OBGYN    []  External Records Requested & Care Team Updated if Applicable       []  External Records Uploaded, Care Team Updated, & History Updated if Applicable    []  Patient Declined Scheduling Pap Smear    []  Patient Will Schedule with External Provider & Care Team Updated if Applicable                  Colorectal Cancer Screening []  Colonoscopy Case Request / Referral / Home Test Order Placed    []  External Records Requested & Care Team Updated if Applicable    []  External Records Uploaded, Care Team Updated, & History Updated if Applicable    []  Patient Declined Completing Colon Cancer Screening    []  Patient Will Schedule with External Provider & Care Team Updated if Applicable    []  Fit Kit Mailed (add the SmartPhrase under additional notes)    []  Reminded Patient to Complete Home Test                Diabetic Eye Exam []  Eye Exam Screening Order Placed     []  Eye Camera Scheduled or Optometry/Ophthalmology Referral Placed    []  External Records Requested & Care Team Updated if Applicable    []  External Records Uploaded, Care Team Updated, & History Updated if Applicable    []  Patient Declined Scheduling Eye Exam    []  Patient Will Schedule with External Provider & Care Team Updated if Applicable             Blood Pressure Control []  Primary Care Follow Up Visit Scheduled     []  Remote Blood Pressure Reading Captured    []  Patient Declined Remote Reading or Scheduling Appt - Escalated to PCP    []  Patient Will Call Back or Send Portal Message with Reading                 HbA1c & Other Labs []  Overdue Lab(s) Ordered    []  Overdue Lab(s) Scheduled    []  External Records Uploaded & Care Team Updated if Applicable    []  Primary Care Follow Up Visit Scheduled     []  Reminded Patient to Complete A1c Home Test    []  Patient Declined Scheduling Labs or Will Call Back to Schedule    []  Patient Will Schedule with External Provider / Order Routed, & Care Team Updated if Applicable           Primary Care Appointment []  Primary Care Appt Scheduled    []  Patient Declined Scheduling or Will Call Back to Schedule    []  Pt Established with External Provider, Updated Care Team, & Informed Pt to Notify Payor if Applicable           Medication Adherence /    Statin Use []  Primary Care Appointment Scheduled    []  Patient Reminded to  Prescription    []  Patient Declined, Provider Notified if Needed    []  Sent Provider Message to Review to Evaluate Pt for Statin, Add Exclusion Dx Codes, Document   Exclusion in Problem List, Change Statin Intensity Level to Moderate or High Intensity if Applicable                Osteoporosis Screening []  Dexa Order Placed    []  Dexa Appointment Scheduled    []  External Records Requested & Care Team Updated    []  External Records Uploaded, Care Team Updated, & History Updated if Applicable    []  Patient Declined Scheduling  Dexa or Will Call Back to Schedule    []  Patient Will Schedule with External Provider / Order Routed & Care Team Updated if Applicable       Additional Notes:.  Post visit Population Health review of encounter with date of service  12/27/24 with Power. Not  All required HY components in encounter.  Chart is opened needs cpt for age 49. Needs labs message sent.  Followup appt for: 11/21/25 HY

## 2025-01-09 ENCOUNTER — TELEPHONE (OUTPATIENT)
Dept: FAMILY MEDICINE | Facility: CLINIC | Age: 50
End: 2025-01-09
Payer: COMMERCIAL

## 2025-02-05 ENCOUNTER — HOSPITAL ENCOUNTER (OUTPATIENT)
Dept: RADIOLOGY | Facility: HOSPITAL | Age: 50
Discharge: HOME | End: 2025-02-05
Attending: FAMILY MEDICINE
Payer: COMMERCIAL

## 2025-02-05 VITALS — WEIGHT: 135 LBS | HEIGHT: 63 IN | BODY MASS INDEX: 23.92 KG/M2

## 2025-02-05 DIAGNOSIS — Z12.31 OTHER SCREENING MAMMOGRAM: ICD-10-CM

## 2025-02-05 PROCEDURE — 77067 SCR MAMMO BI INCL CAD: CPT | Mod: TC

## 2025-03-04 PROBLEM — H65.02 NON-RECURRENT ACUTE SEROUS OTITIS MEDIA OF LEFT EAR: Status: ACTIVE | Noted: 2025-03-04

## 2025-03-04 NOTE — ASSESSMENT & PLAN NOTE
Patient is here for follow-up on her migraines.  They are becoming a little more frequent.  Will refill her Imitrex.  Continue Celexa which helps with the migraines.  Follow-up every 3 months.

## 2025-03-04 NOTE — ASSESSMENT & PLAN NOTE
Insomnia that will treat with Ambien 5 mg at bedtime.  If that is not effective will go to 10 mg.  Follow-up in 3 months.  Sleep hygiene discussed.

## 2025-03-04 NOTE — ASSESSMENT & PLAN NOTE
Lab Results   Component Value Date    LDLCALC 107 01/08/2025    Patient on atorvastatin.  Goal is LDL C of 70 or less.  Continue atorvastatin but would recommend that she increase it to 40 mg.

## 2025-03-04 NOTE — ASSESSMENT & PLAN NOTE
Patient has a history of lupus but is no longer on methotrexate is and has been told that she probably does not have lupus.  Her EDWINA is been positive for while.  She has been treated for years with medication.  Will discontinue the methotrexate but will monitor her labs.

## 2025-03-04 NOTE — ASSESSMENT & PLAN NOTE
Patient reports dizziness and has serous otitis and likely vestibular neuronitis.  Will give the patient a Medrol Dosepak and Zithromax Dosepak.  She is to follow-up p.r.n..  Antivert as needed for the vertigo

## 2025-06-12 DIAGNOSIS — F51.01 PRIMARY INSOMNIA: ICD-10-CM

## 2025-06-12 RX ORDER — ZOLPIDEM TARTRATE 5 MG/1
5 TABLET ORAL NIGHTLY PRN
Qty: 30 TABLET | Refills: 0 | OUTPATIENT
Start: 2025-06-12